# Patient Record
Sex: FEMALE | Race: BLACK OR AFRICAN AMERICAN | NOT HISPANIC OR LATINO | Employment: UNEMPLOYED | URBAN - METROPOLITAN AREA
[De-identification: names, ages, dates, MRNs, and addresses within clinical notes are randomized per-mention and may not be internally consistent; named-entity substitution may affect disease eponyms.]

---

## 2016-08-23 LAB
EXTERNAL ABO GROUPING: NORMAL
EXTERNAL ANTIBODY SCREEN: NEGATIVE
EXTERNAL HEPATITIS B SURFACE ANTIGEN: NEGATIVE
EXTERNAL RH FACTOR: POSITIVE
EXTERNAL SYPHILIS RPR SCREEN: NORMAL

## 2017-01-04 ENCOUNTER — ROUTINE PRENATAL (OUTPATIENT)
Dept: OBSTETRICS AND GYNECOLOGY | Facility: CLINIC | Age: 32
End: 2017-01-04

## 2017-01-04 VITALS — SYSTOLIC BLOOD PRESSURE: 128 MMHG | WEIGHT: 135 LBS | DIASTOLIC BLOOD PRESSURE: 60 MMHG

## 2017-01-04 DIAGNOSIS — Z3A.27 27 WEEKS GESTATION OF PREGNANCY: ICD-10-CM

## 2017-01-04 DIAGNOSIS — R82.90 ABNORMAL URINE ODOR: Primary | ICD-10-CM

## 2017-01-04 DIAGNOSIS — Z34.83 MULTIGRAVIDA IN THIRD TRIMESTER: ICD-10-CM

## 2017-01-04 DIAGNOSIS — O09.33 INSUFFICIENT PRENATAL CARE IN THIRD TRIMESTER: ICD-10-CM

## 2017-01-04 DIAGNOSIS — O99.333 TOBACCO USE AFFECTING PREGNANCY IN THIRD TRIMESTER, ANTEPARTUM: ICD-10-CM

## 2017-01-04 LAB
BILIRUB BLD-MCNC: ABNORMAL MG/DL
GLUCOSE UR STRIP-MCNC: NEGATIVE MG/DL
KETONES UR QL: ABNORMAL
LEUKOCYTE EST, POC: NEGATIVE
NITRITE UR-MCNC: POSITIVE MG/ML
PH UR: 6 [PH] (ref 5–8)
PROT UR STRIP-MCNC: ABNORMAL MG/DL
RBC # UR STRIP: NEGATIVE /UL
SP GR UR: 1.03 (ref 1–1.03)
UROBILINOGEN UR QL: NORMAL

## 2017-01-04 PROCEDURE — 81003 URINALYSIS AUTO W/O SCOPE: CPT | Performed by: OBSTETRICS & GYNECOLOGY

## 2017-01-04 PROCEDURE — 99214 OFFICE O/P EST MOD 30 MIN: CPT | Performed by: OBSTETRICS & GYNECOLOGY

## 2017-01-04 RX ORDER — NITROFURANTOIN 25; 75 MG/1; MG/1
100 CAPSULE ORAL 2 TIMES DAILY
Qty: 14 CAPSULE | Refills: 0 | Status: SHIPPED | OUTPATIENT
Start: 2017-01-04 | End: 2017-01-11

## 2017-01-04 NOTE — PROGRESS NOTES
"Pt states she has missed visit because she has been busy. Pt with multiple complaints, she c/o pelvic pain, n/v, body aches, needing referral to dentist for broken tooth, odor with urine and knot in middle of her chest that comes and goes when the knot is present it is painful. Cc u/a tace of ketones, small bilirubin & positive nitrites. Pt needs Gtt1 ordered along with Tox screen and urine culture.  Multiple issues - has fractured tooth.  Patient referred to [facility] dental.  Has lower back and pelvic pain - reassurance given.  No bleeding or spotting.  Mild nausea, but typically influenced by food intake - discussed dietary modifications.  Urine culture pending.  Knot on sternum likely \"vascular\" and not related to breast.  Patient to observe.  Size less than dates - sonogram at next visit.  I spent 35 minutes with patient.  "

## 2017-01-04 NOTE — MR AVS SNAPSHOT
Johanna Zeny   2017 3:15 PM   Routine Prenatal    Dept Phone:  [phone]   Encounter #:  [id number]    Provider:  Zaire Ann MD   Department:  [facility]                Your Full Care Plan              Your Updated Medication List          This list is accurate as of: 17  4:42 PM.  Always use your most recent med list.                promethazine 12.5 MG tablet   Commonly known as:  PHENERGAN   Take 1 tablet by mouth Every 6 (Six) Hours As Needed for nausea or vomiting.               We Performed the Following     Ambulatory Referral to Dentistry     Drug Profile Urine - 9 Drugs     Gestational Screen 1 Hr ([facility])     POC Urinalysis Dipstick, Automated     Urine Culture       You Were Diagnosed With        Codes Comments    Abnormal urine odor    -  Primary ICD-10-CM: R82.90  ICD-9-CM: 791.9     Tobacco use affecting pregnancy in third trimester, antepartum     ICD-10-CM: O99.333  ICD-9-CM: 649.03     27 weeks gestation of pregnancy     ICD-10-CM: Z3A.27  ICD-9-CM: V22.2     Insufficient prenatal care in third trimester     ICD-10-CM: O09.33  ICD-9-CM: V23.7     Multigravida in third trimester     ICD-10-CM: Z34.83  ICD-9-CM: V22.1       Instructions     None    Patient Instructions History      Upcoming Appointments     Visit Type Date Time Department    OB FOLLOWUP 2017  3:15 PM [facility] [place]    ULTRASOUND 2017  1:20 PM [facility] LOBGYN PRES    OB FOLLOWUP 2017  1:45 PM [facility] LOBGYN PRES      MyChart Signup     [facility] [employer] allows you to send messages to your doctor, view your test results, renew your prescriptions, schedule appointments, and more. To sign up, go to [employer] and click on the Sign Up Now link in the New User? box. Enter your [employer] Activation Code exactly as it appears below along with the last four digits of your Social Security Number and your Date of Birth () to  complete the sign-up process. If you do not sign up before the expiration date, you must request a new code.    [employer] Activation Code: [email or web address]  Expires: 1/18/2017  4:32 PM    If you have questions, you can email [email or web address] or call [phone] to talk to our [employer] staff. Remember, [employer] is NOT to be used for urgent needs. For medical emergencies, dial 911.               Other Info from Your Visit           Your Appointments     Jan 17, 2017  1:20 PM EST   Ultrasound with ULTRASOUND LOBGYN PRES   [facility] (--)    [address] [address] [phone]            Jan 17, 2017  1:45 PM EST   OB FOLLOWUP with Zaire Ann MD   [facility] (--)    [address] [address] [phone]              Allergies     No Known Allergies      Reason for Visit     Routine Prenatal Visit           Vital Signs     Blood Pressure Weight Last Menstrual Period Smoking Status          128/60 135 lb (61.2 kg) 06/10/2016 Current Every Day Smoker        Problems and Diagnoses Noted     Abnormal urine odor    -  Primary    Tobacco use affecting pregnancy in third trimester, antepartum        27 weeks gestation of pregnancy        Insufficient prenatal care in third trimester        Multigravida in third trimester          Results     POC Urinalysis Dipstick, Automated      Component Value Standard Range & Units    Glucose, UA Negative Negative, 1000 mg/dL (3+) mg/dL    Bilirubin Small (1+) Negative    Ketones, UA Trace Negative    Specific Gravity  1.030 1.005 - 1.030    Blood, UA Negative Negative    pH, Urine 6.0 5.0 - 8.0    Protein, POC 30 mg/dL Negative mg/dL    Urobilinogen, UA Normal Normal    Leukocytes Negative Negative    Nitrite, UA Positive Negative

## 2017-01-05 ENCOUNTER — TELEPHONE (OUTPATIENT)
Dept: OBSTETRICS AND GYNECOLOGY | Facility: CLINIC | Age: 32
End: 2017-01-05

## 2017-01-05 NOTE — TELEPHONE ENCOUNTER
----- Message from Zaire Ann MD sent at 1/4/2017  5:08 PM EST -----  Let her know that she has a UTI - I called her in antibiotics

## 2017-01-07 LAB
BACTERIA UR CULT: ABNORMAL
BACTERIA UR CULT: ABNORMAL
OTHER ANTIBIOTIC SUSC ISLT: ABNORMAL

## 2017-01-15 LAB
AMPHETAMINES UR QL SCN: NEGATIVE NG/ML
BARBITURATES UR QL SCN: NEGATIVE NG/ML
BENZODIAZ UR QL: NEGATIVE NG/ML
BZE UR QL: NEGATIVE NG/ML
CANNABINOIDS UR QL SCN: NEGATIVE NG/ML
METHADONE UR QL SCN: NEGATIVE NG/ML
OPIATES UR QL: POSITIVE
PCP UR QL: NEGATIVE NG/ML
PROPOXYPH UR QL: NEGATIVE NG/ML

## 2017-01-17 ENCOUNTER — PROCEDURE VISIT (OUTPATIENT)
Dept: OBSTETRICS AND GYNECOLOGY | Facility: CLINIC | Age: 32
End: 2017-01-17

## 2017-01-17 ENCOUNTER — ROUTINE PRENATAL (OUTPATIENT)
Dept: OBSTETRICS AND GYNECOLOGY | Facility: CLINIC | Age: 32
End: 2017-01-17

## 2017-01-17 VITALS — SYSTOLIC BLOOD PRESSURE: 115 MMHG | WEIGHT: 134 LBS | DIASTOLIC BLOOD PRESSURE: 71 MMHG

## 2017-01-17 DIAGNOSIS — O26.843 UTERINE SIZE DATE DISCREPANCY PREGNANCY, THIRD TRIMESTER: Primary | ICD-10-CM

## 2017-01-17 DIAGNOSIS — O99.333 TOBACCO USE AFFECTING PREGNANCY IN THIRD TRIMESTER, ANTEPARTUM: ICD-10-CM

## 2017-01-17 DIAGNOSIS — Z3A.29 29 WEEKS GESTATION OF PREGNANCY: Primary | ICD-10-CM

## 2017-01-17 DIAGNOSIS — Z34.83 MULTIGRAVIDA IN THIRD TRIMESTER: ICD-10-CM

## 2017-01-17 PROCEDURE — 76816 OB US FOLLOW-UP PER FETUS: CPT | Performed by: OBSTETRICS & GYNECOLOGY

## 2017-01-17 PROCEDURE — 99213 OFFICE O/P EST LOW 20 MIN: CPT | Performed by: OBSTETRICS & GYNECOLOGY

## 2017-01-17 NOTE — MR AVS SNAPSHOT
Johanna Moura   2017 1:45 PM   Routine Prenatal    Dept Phone:  [phone]   Encounter #:  [id number]    Provider:  Zaire Ann MD   Department:  [facility]                Your Full Care Plan              Your Updated Medication List          This list is accurate as of: 17  2:46 PM.  Always use your most recent med list.                promethazine 12.5 MG tablet   Commonly known as:  PHENERGAN   Take 1 tablet by mouth Every 6 (Six) Hours As Needed for nausea or vomiting.               Instructions     None    Patient Instructions History      Upcoming Appointments     Visit Type Date Time Department    ULTRASOUND 2017  1:20 PM [facility] LOB[facility] PRES    OB FOLLOWUP 2017  1:45 PM [facility] LOBGYN PRES      MyChart Signup     [facility] [employer] allows you to send messages to your doctor, view your test results, renew your prescriptions, schedule appointments, and more. To sign up, go to [employer] and click on the Sign Up Now link in the New User? box. Enter your [employer] Activation Code exactly as it appears below along with the last four digits of your Social Security Number and your Date of Birth () to complete the sign-up process. If you do not sign up before the expiration date, you must request a new code.    [employer] Activation Code: [email or web address]  Expires: 2017  4:32 PM    If you have questions, you can email [email or web address] or call [phone] to talk to our [employer] staff. Remember, [employer] is NOT to be used for urgent needs. For medical emergencies, dial 911.               Other Info from Your Visit           Allergies     No Known Allergies      Reason for Visit     Routine Prenatal Visit           Vital Signs     Blood Pressure Weight Last Menstrual Period Smoking Status          115/71 134 lb (60.8 kg) 06/10/2016 Current Every Day Smoker

## 2017-01-17 NOTE — MR AVS SNAPSHOT
[facility]  [phone]                    Johanna Moura   2017 1:20 PM   Procedure visit    Dept Phone:  [phone]   Encounter #:  [id number]    Provider:  ULTRASOUND LOBGYN PRES   Department:  [facility]                Your Full Care Plan              Your Updated Medication List          This list is accurate as of: 17  1:48 PM.  Always use your most recent med list.                promethazine 12.5 MG tablet   Commonly known as:  PHENERGAN   Take 1 tablet by mouth Every 6 (Six) Hours As Needed for nausea or vomiting.               We Performed the Following     US Ob Follow Up Transabdominal Approach       You Were Diagnosed With        Codes Comments    Uterine size date discrepancy pregnancy, third trimester    -  Primary ICD-10-CM: O26.843  ICD-9-CM: 649.63       Instructions     None    Patient Instructions History      Upcoming Appointments     Visit Type Date Time Department    ULTRASOUND 2017  1:20 PM [facility] LOBGYN PRES    OB FOLLOWUP 2017  1:45 PM [facility] LOBGYN PRES      [employer] Signup     [employer] allows you to send messages to your doctor, view your test results, renew your prescriptions, schedule appointments, and more. To sign up, go to [employer] and click on the Sign Up Now link in the New User? box. Enter your [employer] Activation Code exactly as it appears below along with the last four digits of your Social Security Number and your Date of Birth () to complete the sign-up process. If you do not sign up before the expiration date, you must request a new code.    [employer] Activation Code: [email or web address]  Expires: 2017  4:32 PM    If you have questions, you can email [email or web address] or call [phone] to talk to our [employer] staff. Remember, [employer] is NOT to be used for urgent needs. For medical emergencies, dial 911.               Other Info from Your Visit           Allergies     No Known Allergies      Vital Signs     Last Menstrual Period Smoking Status                06/10/2016 Current Every Day Smoker          Problems and Diagnoses Noted     Measured size of uterus different from size expected for dates    -  Primary

## 2017-01-31 ENCOUNTER — ROUTINE PRENATAL (OUTPATIENT)
Dept: OBSTETRICS AND GYNECOLOGY | Facility: CLINIC | Age: 32
End: 2017-01-31

## 2017-01-31 VITALS — WEIGHT: 140 LBS | DIASTOLIC BLOOD PRESSURE: 78 MMHG | SYSTOLIC BLOOD PRESSURE: 122 MMHG

## 2017-01-31 DIAGNOSIS — Z3A.31 31 WEEKS GESTATION OF PREGNANCY: Primary | ICD-10-CM

## 2017-01-31 DIAGNOSIS — Z34.83 MULTIGRAVIDA IN THIRD TRIMESTER: ICD-10-CM

## 2017-01-31 DIAGNOSIS — O99.333 TOBACCO USE AFFECTING PREGNANCY IN THIRD TRIMESTER, ANTEPARTUM: ICD-10-CM

## 2017-01-31 PROCEDURE — 99213 OFFICE O/P EST LOW 20 MIN: CPT | Performed by: OBSTETRICS & GYNECOLOGY

## 2017-01-31 NOTE — PROGRESS NOTES
Cc:  UTI symptoms.  She is still c/o her side hurting and odor with urine. Pt still needs Gtt1 done states she will come back this afternoon and do.  UTI symptoms - patient requesting antibiotics.   States has left sided abdominal pain and odor with urination.  No fevers or chills.  Appetite normal. Needs to leave sterile urine first.  She is returning for glucola and I instructed her to do this.  Patient to follow up in 2 weeks for regular visit.  I spent 15 minutes with patient.

## 2017-01-31 NOTE — MR AVS SNAPSHOT
Johanna Moura   2017 9:00 AM   Routine Prenatal    Dept Phone:  [phone]   Encounter #:  [id number]    Provider:  Zaire Ann MD   Department:  [facility]                Your Full Care Plan              Your Updated Medication List          This list is accurate as of: 17 10:21 AM.  Always use your most recent med list.                promethazine 12.5 MG tablet   Commonly known as:  PHENERGAN   Take 1 tablet by mouth Every 6 (Six) Hours As Needed for nausea or vomiting.               Instructions     None    Patient Instructions History      Upcoming Appointments     Visit Type Date Time Department    OB FOLLOWUP 2017  9:00 AM [facility] PRES      [employer] Signup     [facility] [employer] allows you to send messages to your doctor, view your test results, renew your prescriptions, schedule appointments, and more. To sign up, go to [employer] and click on the Sign Up Now link in the New User? box. Enter your [employer] Activation Code exactly as it appears below along with the last four digits of your Social Security Number and your Date of Birth () to complete the sign-up process. If you do not sign up before the expiration date, you must request a new code.    [employer] Activation Code: [email or web address]  Expires: 2017 10:21 AM    If you have questions, you can email [email or web address] or call [phone] to talk to our [employer] staff. Remember, [employer] is NOT to be used for urgent needs. For medical emergencies, dial 911.               Other Info from Your Visit           Allergies     No Known Allergies      Reason for Visit     Routine Prenatal Visit           Vital Signs     Blood Pressure Weight Last Menstrual Period Smoking Status          122/78 140 lb (63.5 kg) 06/10/2016 Current Every Day Smoker

## 2017-02-08 LAB — GLUCOSE 1H P 50 G GLC PO SERPL-MCNC: 143 MG/DL (ref 65–139)

## 2017-02-09 ENCOUNTER — TELEPHONE (OUTPATIENT)
Dept: OBSTETRICS AND GYNECOLOGY | Facility: CLINIC | Age: 32
End: 2017-02-09

## 2017-02-09 NOTE — TELEPHONE ENCOUNTER
----- Message from Zaire Ann MD sent at 2/8/2017  8:06 PM EST -----  LAW - Let her know that she did not pass her diabetes test and she needs to do the 3 hour test.  Thanks.

## 2017-02-10 ENCOUNTER — TELEPHONE (OUTPATIENT)
Dept: OBSTETRICS AND GYNECOLOGY | Facility: CLINIC | Age: 32
End: 2017-02-10

## 2017-02-10 DIAGNOSIS — O99.810 ABNORMAL GLUCOSE IN PREGNANCY, ANTEPARTUM: Primary | ICD-10-CM

## 2017-02-11 LAB
GLUCOSE 1H P 100 G GLC PO SERPL-MCNC: 101 MG/DL (ref 50–350)
GLUCOSE 2H P 100 G GLC PO SERPL-MCNC: 95 MG/DL (ref 50–350)
GLUCOSE 3H P 100 G GLC PO SERPL-MCNC: 67 MG/DL
GLUCOSE P FAST SERPL-MCNC: 108 MG/DL

## 2017-02-14 ENCOUNTER — ROUTINE PRENATAL (OUTPATIENT)
Dept: OBSTETRICS AND GYNECOLOGY | Facility: CLINIC | Age: 32
End: 2017-02-14

## 2017-02-14 VITALS — DIASTOLIC BLOOD PRESSURE: 87 MMHG | WEIGHT: 140 LBS | SYSTOLIC BLOOD PRESSURE: 138 MMHG

## 2017-02-14 DIAGNOSIS — Z34.83 MULTIGRAVIDA IN THIRD TRIMESTER: ICD-10-CM

## 2017-02-14 DIAGNOSIS — Z3A.33 33 WEEKS GESTATION OF PREGNANCY: Primary | ICD-10-CM

## 2017-02-14 DIAGNOSIS — O99.333 TOBACCO USE AFFECTING PREGNANCY IN THIRD TRIMESTER, ANTEPARTUM: ICD-10-CM

## 2017-02-14 PROCEDURE — 99213 OFFICE O/P EST LOW 20 MIN: CPT | Performed by: OBSTETRICS & GYNECOLOGY

## 2017-02-14 PROCEDURE — 99406 BEHAV CHNG SMOKING 3-10 MIN: CPT | Performed by: OBSTETRICS & GYNECOLOGY

## 2017-02-28 ENCOUNTER — ROUTINE PRENATAL (OUTPATIENT)
Dept: OBSTETRICS AND GYNECOLOGY | Facility: CLINIC | Age: 32
End: 2017-02-28

## 2017-02-28 ENCOUNTER — HOSPITAL ENCOUNTER (OUTPATIENT)
Facility: HOSPITAL | Age: 32
Setting detail: OBSERVATION
Discharge: HOME | End: 2017-02-28
Attending: OBSTETRICS & GYNECOLOGY | Admitting: OBSTETRICS & GYNECOLOGY

## 2017-02-28 VITALS
WEIGHT: 145 LBS | RESPIRATION RATE: 16 BRPM | OXYGEN SATURATION: 100 % | TEMPERATURE: 98.8 F | SYSTOLIC BLOOD PRESSURE: 123 MMHG | DIASTOLIC BLOOD PRESSURE: 89 MMHG | BODY MASS INDEX: 21.98 KG/M2 | HEIGHT: 68 IN | HEART RATE: 79 BPM

## 2017-02-28 VITALS — SYSTOLIC BLOOD PRESSURE: 151 MMHG | DIASTOLIC BLOOD PRESSURE: 90 MMHG | WEIGHT: 145 LBS

## 2017-02-28 DIAGNOSIS — Z3A.35 35 WEEKS GESTATION OF PREGNANCY: Primary | ICD-10-CM

## 2017-02-28 DIAGNOSIS — O13.3 PIH (PREGNANCY INDUCED HYPERTENSION), THIRD TRIMESTER: ICD-10-CM

## 2017-02-28 DIAGNOSIS — O99.333 TOBACCO USE AFFECTING PREGNANCY IN THIRD TRIMESTER, ANTEPARTUM: ICD-10-CM

## 2017-02-28 DIAGNOSIS — Z34.83 MULTIGRAVIDA IN THIRD TRIMESTER: ICD-10-CM

## 2017-02-28 PROBLEM — Z34.90 PREGNANCY: Status: ACTIVE | Noted: 2017-02-28

## 2017-02-28 LAB
ALBUMIN SERPL-MCNC: 2.9 G/DL (ref 3.5–5.2)
ALBUMIN/GLOB SERPL: 0.7 G/DL
ALP SERPL-CCNC: 205 U/L (ref 39–117)
ALT SERPL W P-5'-P-CCNC: 7 U/L (ref 1–33)
AMPHET+METHAMPHET UR QL: NEGATIVE
ANION GAP SERPL CALCULATED.3IONS-SCNC: 13.6 MMOL/L
AST SERPL-CCNC: 18 U/L (ref 1–32)
BARBITURATES UR QL SCN: NEGATIVE
BASOPHILS # BLD AUTO: 0.01 10*3/MM3 (ref 0–0.2)
BASOPHILS NFR BLD AUTO: 0.2 % (ref 0–1.5)
BENZODIAZ UR QL SCN: NEGATIVE
BILIRUB SERPL-MCNC: 0.3 MG/DL (ref 0.1–1.2)
BILIRUB UR QL STRIP: NEGATIVE
BUN BLD-MCNC: 5 MG/DL (ref 6–20)
BUN/CREAT SERPL: 8.8 (ref 7–25)
CALCIUM SPEC-SCNC: 9 MG/DL (ref 8.6–10.5)
CANNABINOIDS SERPL QL: NEGATIVE
CHLORIDE SERPL-SCNC: 100 MMOL/L (ref 98–107)
CLARITY UR: CLEAR
CO2 SERPL-SCNC: 23.4 MMOL/L (ref 22–29)
COCAINE UR QL: NEGATIVE
COLOR UR: YELLOW
CREAT BLD-MCNC: 0.57 MG/DL (ref 0.57–1)
DEPRECATED RDW RBC AUTO: 37.5 FL (ref 37–54)
EOSINOPHIL # BLD AUTO: 0.03 10*3/MM3 (ref 0–0.7)
EOSINOPHIL NFR BLD AUTO: 0.5 % (ref 0.3–6.2)
ERYTHROCYTE [DISTWIDTH] IN BLOOD BY AUTOMATED COUNT: 12.6 % (ref 11.7–13)
EXPIRATION DATE: ABNORMAL
EXTERNAL GROUP B STREP ANTIGEN: NORMAL
GFR SERPL CREATININE-BSD FRML MDRD: 150 ML/MIN/1.73
GLOBULIN UR ELPH-MCNC: 4 GM/DL
GLUCOSE BLD-MCNC: 81 MG/DL (ref 65–99)
GLUCOSE UR STRIP-MCNC: NEGATIVE MG/DL
HBV SURFACE AG SERPL QL IA: NORMAL
HCT VFR BLD AUTO: 32.3 % (ref 35.6–45.5)
HCV AB SER DONR QL: NORMAL
HCV AB SER DONR QL: NORMAL
HGB BLD-MCNC: 10.2 G/DL (ref 11.9–15.5)
HGB UR QL STRIP.AUTO: NEGATIVE
HIV1 P24 AG SER QL: NORMAL
HIV1+2 AB SER QL: NORMAL
IMM GRANULOCYTES # BLD: 0.02 10*3/MM3 (ref 0–0.03)
IMM GRANULOCYTES NFR BLD: 0.3 % (ref 0–0.5)
KETONES UR QL STRIP: NEGATIVE
LEUKOCYTE ESTERASE UR QL STRIP.AUTO: NEGATIVE
LYMPHOCYTES # BLD AUTO: 1.62 10*3/MM3 (ref 0.9–4.8)
LYMPHOCYTES NFR BLD AUTO: 24.8 % (ref 19.6–45.3)
Lab: ABNORMAL
MCH RBC QN AUTO: 25.7 PG (ref 26.9–32)
MCHC RBC AUTO-ENTMCNC: 31.6 G/DL (ref 32.4–36.3)
MCV RBC AUTO: 81.4 FL (ref 80.5–98.2)
METHADONE UR QL SCN: NEGATIVE
MONOCYTES # BLD AUTO: 0.47 10*3/MM3 (ref 0.2–1.2)
MONOCYTES NFR BLD AUTO: 7.2 % (ref 5–12)
NEUTROPHILS # BLD AUTO: 4.39 10*3/MM3 (ref 1.9–8.1)
NEUTROPHILS NFR BLD AUTO: 67 % (ref 42.7–76)
NITRITE UR QL STRIP: NEGATIVE
OPIATES UR QL: POSITIVE
OXYCODONE UR QL SCN: NEGATIVE
PH UR STRIP.AUTO: 7 [PH] (ref 5–8)
PLATELET # BLD AUTO: 221 10*3/MM3 (ref 140–500)
PMV BLD AUTO: 10.9 FL (ref 6–12)
POTASSIUM BLD-SCNC: 4 MMOL/L (ref 3.5–5.2)
PROT SERPL-MCNC: 6.9 G/DL (ref 6–8.5)
PROT UR QL STRIP: NEGATIVE
PROT UR STRIP-MCNC: ABNORMAL MG/DL
RBC # BLD AUTO: 3.97 10*6/MM3 (ref 3.9–5.2)
SODIUM BLD-SCNC: 137 MMOL/L (ref 136–145)
SP GR UR STRIP: 1.01 (ref 1–1.03)
UROBILINOGEN UR QL STRIP: NORMAL
WBC NRBC COR # BLD: 6.54 10*3/MM3 (ref 4.5–10.7)

## 2017-02-28 PROCEDURE — 59025 FETAL NON-STRESS TEST: CPT | Performed by: OBSTETRICS & GYNECOLOGY

## 2017-02-28 PROCEDURE — 85025 COMPLETE CBC W/AUTO DIFF WBC: CPT | Performed by: OBSTETRICS & GYNECOLOGY

## 2017-02-28 PROCEDURE — 80307 DRUG TEST PRSMV CHEM ANLYZR: CPT | Performed by: OBSTETRICS & GYNECOLOGY

## 2017-02-28 PROCEDURE — 81003 URINALYSIS AUTO W/O SCOPE: CPT | Performed by: OBSTETRICS & GYNECOLOGY

## 2017-02-28 PROCEDURE — G0432 EIA HIV-1/HIV-2 SCREEN: HCPCS | Performed by: OBSTETRICS & GYNECOLOGY

## 2017-02-28 PROCEDURE — 87899 AGENT NOS ASSAY W/OPTIC: CPT | Performed by: OBSTETRICS & GYNECOLOGY

## 2017-02-28 PROCEDURE — G0378 HOSPITAL OBSERVATION PER HR: HCPCS

## 2017-02-28 PROCEDURE — 86803 HEPATITIS C AB TEST: CPT | Performed by: OBSTETRICS & GYNECOLOGY

## 2017-02-28 PROCEDURE — 99214 OFFICE O/P EST MOD 30 MIN: CPT | Performed by: OBSTETRICS & GYNECOLOGY

## 2017-02-28 PROCEDURE — 59025 FETAL NON-STRESS TEST: CPT

## 2017-02-28 PROCEDURE — 80053 COMPREHEN METABOLIC PANEL: CPT | Performed by: OBSTETRICS & GYNECOLOGY

## 2017-02-28 PROCEDURE — 87340 HEPATITIS B SURFACE AG IA: CPT | Performed by: OBSTETRICS & GYNECOLOGY

## 2017-02-28 RX ORDER — HYDROCODONE BITARTRATE AND ACETAMINOPHEN 7.5; 325 MG/1; MG/1
1 TABLET ORAL EVERY 6 HOURS PRN
COMMUNITY
End: 2017-03-14 | Stop reason: HOSPADM

## 2017-02-28 NOTE — PROGRESS NOTES
Cc:  C/o n/v, she has/uses promethazine but does not really help. She also c/o ankles and feet swelling.     Patient generally feels well.  AFM.  Has appetite but has vomiting after eating.  Promethazine not working well.  No chest pain or visual symptoms.  No bleeding or spotting.  Some cramping.  Swelling in legs described.  Exam:  As above with Gen - alert and in no distress.  Abdomen - soft, gravid.  Repeat /100    A/P:  IUP at 35+ weeks with elevated BP  - Gbs done.  - Patient to go to triage for further evaluation.  Understands rationale  - Follow up in one week  - I spent 25 minutes with patient and greater than 50% in counseling.

## 2017-03-01 ENCOUNTER — TELEPHONE (OUTPATIENT)
Dept: LABOR AND DELIVERY | Facility: HOSPITAL | Age: 32
End: 2017-03-01

## 2017-03-02 LAB — GP B STREP DNA SPEC QL NAA+PROBE: NEGATIVE

## 2017-03-09 ENCOUNTER — ANESTHESIA EVENT (OUTPATIENT)
Dept: LABOR AND DELIVERY | Facility: HOSPITAL | Age: 32
End: 2017-03-09

## 2017-03-09 ENCOUNTER — ANESTHESIA (OUTPATIENT)
Dept: LABOR AND DELIVERY | Facility: HOSPITAL | Age: 32
End: 2017-03-09

## 2017-03-09 ENCOUNTER — HOSPITAL ENCOUNTER (INPATIENT)
Facility: HOSPITAL | Age: 32
LOS: 5 days | Discharge: HOME | End: 2017-03-14
Attending: OBSTETRICS & GYNECOLOGY | Admitting: OBSTETRICS & GYNECOLOGY

## 2017-03-09 DIAGNOSIS — Z37.9 NORMAL LABOR: Primary | ICD-10-CM

## 2017-03-09 DIAGNOSIS — O13.3 GESTATIONAL HYPERTENSION W/O SIGNIFICANT PROTEINURIA IN 3RD TRIMESTER: ICD-10-CM

## 2017-03-09 PROBLEM — O99.333 TOBACCO SMOKING AFFECTING PREGNANCY IN THIRD TRIMESTER: Status: ACTIVE | Noted: 2017-03-09

## 2017-03-09 LAB
ABO GROUP BLD: NORMAL
ALBUMIN SERPL-MCNC: 3 G/DL (ref 3.5–5.2)
ALBUMIN/GLOB SERPL: 0.7 G/DL
ALP SERPL-CCNC: 239 U/L (ref 39–117)
ALT SERPL W P-5'-P-CCNC: 8 U/L (ref 1–33)
AMPHET+METHAMPHET UR QL: NEGATIVE
ANION GAP SERPL CALCULATED.3IONS-SCNC: 15 MMOL/L
AST SERPL-CCNC: 21 U/L (ref 1–32)
BARBITURATES UR QL SCN: NEGATIVE
BASOPHILS # BLD AUTO: 0.01 10*3/MM3 (ref 0–0.2)
BASOPHILS NFR BLD AUTO: 0.1 % (ref 0–1.5)
BENZODIAZ UR QL SCN: NEGATIVE
BILIRUB SERPL-MCNC: 0.3 MG/DL (ref 0.1–1.2)
BLD GP AB SCN SERPL QL: NEGATIVE
BUN BLD-MCNC: 7 MG/DL (ref 6–20)
BUN/CREAT SERPL: 10.1 (ref 7–25)
CALCIUM SPEC-SCNC: 9.1 MG/DL (ref 8.6–10.5)
CANNABINOIDS SERPL QL: NEGATIVE
CHLORIDE SERPL-SCNC: 99 MMOL/L (ref 98–107)
CO2 SERPL-SCNC: 24 MMOL/L (ref 22–29)
COCAINE UR QL: NEGATIVE
CREAT BLD-MCNC: 0.69 MG/DL (ref 0.57–1)
DEPRECATED RDW RBC AUTO: 37.9 FL (ref 37–54)
EOSINOPHIL # BLD AUTO: 0.01 10*3/MM3 (ref 0–0.7)
EOSINOPHIL NFR BLD AUTO: 0.1 % (ref 0.3–6.2)
ERYTHROCYTE [DISTWIDTH] IN BLOOD BY AUTOMATED COUNT: 12.8 % (ref 11.7–13)
GFR SERPL CREATININE-BSD FRML MDRD: 120 ML/MIN/1.73
GLOBULIN UR ELPH-MCNC: 4.1 GM/DL
GLUCOSE BLD-MCNC: 87 MG/DL (ref 65–99)
HCT VFR BLD AUTO: 34.2 % (ref 35.6–45.5)
HGB BLD-MCNC: 11 G/DL (ref 11.9–15.5)
IMM GRANULOCYTES # BLD: 0.02 10*3/MM3 (ref 0–0.03)
IMM GRANULOCYTES NFR BLD: 0.2 % (ref 0–0.5)
LYMPHOCYTES # BLD AUTO: 2.32 10*3/MM3 (ref 0.9–4.8)
LYMPHOCYTES NFR BLD AUTO: 25.7 % (ref 19.6–45.3)
MCH RBC QN AUTO: 26.1 PG (ref 26.9–32)
MCHC RBC AUTO-ENTMCNC: 32.2 G/DL (ref 32.4–36.3)
MCV RBC AUTO: 81.2 FL (ref 80.5–98.2)
METHADONE UR QL SCN: NEGATIVE
MONOCYTES # BLD AUTO: 0.64 10*3/MM3 (ref 0.2–1.2)
MONOCYTES NFR BLD AUTO: 7.1 % (ref 5–12)
NEUTROPHILS # BLD AUTO: 6.01 10*3/MM3 (ref 1.9–8.1)
NEUTROPHILS NFR BLD AUTO: 66.8 % (ref 42.7–76)
OPIATES UR QL: POSITIVE
OXYCODONE UR QL SCN: POSITIVE
PLATELET # BLD AUTO: 211 10*3/MM3 (ref 140–500)
PMV BLD AUTO: 11.3 FL (ref 6–12)
POTASSIUM BLD-SCNC: 3.7 MMOL/L (ref 3.5–5.2)
PROT SERPL-MCNC: 7.1 G/DL (ref 6–8.5)
RBC # BLD AUTO: 4.21 10*6/MM3 (ref 3.9–5.2)
RH BLD: POSITIVE
SODIUM BLD-SCNC: 138 MMOL/L (ref 136–145)
WBC NRBC COR # BLD: 9.01 10*3/MM3 (ref 4.5–10.7)

## 2017-03-09 PROCEDURE — 85025 COMPLETE CBC W/AUTO DIFF WBC: CPT | Performed by: OBSTETRICS & GYNECOLOGY

## 2017-03-09 PROCEDURE — 86900 BLOOD TYPING SEROLOGIC ABO: CPT | Performed by: OBSTETRICS & GYNECOLOGY

## 2017-03-09 PROCEDURE — 25010000002 ROPIVACAINE PER 1 MG: Performed by: ANESTHESIOLOGY

## 2017-03-09 PROCEDURE — 80307 DRUG TEST PRSMV CHEM ANLYZR: CPT | Performed by: OBSTETRICS & GYNECOLOGY

## 2017-03-09 PROCEDURE — 10907ZC DRAINAGE OF AMNIOTIC FLUID, THERAPEUTIC FROM PRODUCTS OF CONCEPTION, VIA NATURAL OR ARTIFICIAL OPENING: ICD-10-PCS | Performed by: OBSTETRICS & GYNECOLOGY

## 2017-03-09 PROCEDURE — 86901 BLOOD TYPING SEROLOGIC RH(D): CPT | Performed by: OBSTETRICS & GYNECOLOGY

## 2017-03-09 PROCEDURE — C1755 CATHETER, INTRASPINAL: HCPCS

## 2017-03-09 PROCEDURE — C1755 CATHETER, INTRASPINAL: HCPCS | Performed by: ANESTHESIOLOGY

## 2017-03-09 PROCEDURE — 86850 RBC ANTIBODY SCREEN: CPT | Performed by: OBSTETRICS & GYNECOLOGY

## 2017-03-09 PROCEDURE — 80053 COMPREHEN METABOLIC PANEL: CPT | Performed by: OBSTETRICS & GYNECOLOGY

## 2017-03-09 RX ORDER — ONDANSETRON 2 MG/ML
4 INJECTION INTRAMUSCULAR; INTRAVENOUS EVERY 6 HOURS PRN
Status: DISCONTINUED | OUTPATIENT
Start: 2017-03-09 | End: 2017-03-10

## 2017-03-09 RX ORDER — SODIUM CHLORIDE 0.9 % (FLUSH) 0.9 %
1-10 SYRINGE (ML) INJECTION AS NEEDED
Status: DISCONTINUED | OUTPATIENT
Start: 2017-03-09 | End: 2017-03-10

## 2017-03-09 RX ORDER — FAMOTIDINE 10 MG/ML
20 INJECTION, SOLUTION INTRAVENOUS ONCE AS NEEDED
Status: DISCONTINUED | OUTPATIENT
Start: 2017-03-09 | End: 2017-03-10

## 2017-03-09 RX ORDER — SUFENTANIL CITRATE 50 UG/ML
INJECTION EPIDURAL; INTRAVENOUS
Status: DISPENSED
Start: 2017-03-09 | End: 2017-03-10

## 2017-03-09 RX ORDER — ACETAMINOPHEN 325 MG/1
650 TABLET ORAL EVERY 4 HOURS PRN
Status: DISCONTINUED | OUTPATIENT
Start: 2017-03-09 | End: 2017-03-10

## 2017-03-09 RX ORDER — BUTORPHANOL TARTRATE 1 MG/ML
1 INJECTION, SOLUTION INTRAMUSCULAR; INTRAVENOUS
Status: DISCONTINUED | OUTPATIENT
Start: 2017-03-09 | End: 2017-03-10

## 2017-03-09 RX ORDER — FAMOTIDINE 10 MG/ML
20 INJECTION, SOLUTION INTRAVENOUS 2 TIMES DAILY
Status: DISCONTINUED | OUTPATIENT
Start: 2017-03-09 | End: 2017-03-10

## 2017-03-09 RX ORDER — FAMOTIDINE 20 MG/1
20 TABLET, FILM COATED ORAL 2 TIMES DAILY
Status: DISCONTINUED | OUTPATIENT
Start: 2017-03-09 | End: 2017-03-10

## 2017-03-09 RX ORDER — ONDANSETRON 4 MG/1
4 TABLET, FILM COATED ORAL EVERY 6 HOURS PRN
Status: DISCONTINUED | OUTPATIENT
Start: 2017-03-09 | End: 2017-03-10

## 2017-03-09 RX ORDER — TERBUTALINE SULFATE 1 MG/ML
0.25 INJECTION, SOLUTION SUBCUTANEOUS AS NEEDED
Status: DISCONTINUED | OUTPATIENT
Start: 2017-03-09 | End: 2017-03-10

## 2017-03-09 RX ORDER — DIPHENHYDRAMINE HCL 25 MG
25 CAPSULE ORAL NIGHTLY PRN
Status: DISCONTINUED | OUTPATIENT
Start: 2017-03-09 | End: 2017-03-10

## 2017-03-09 RX ORDER — DIPHENHYDRAMINE HYDROCHLORIDE 50 MG/ML
25 INJECTION INTRAMUSCULAR; INTRAVENOUS NIGHTLY PRN
Status: DISCONTINUED | OUTPATIENT
Start: 2017-03-09 | End: 2017-03-10

## 2017-03-09 RX ORDER — SODIUM CHLORIDE, SODIUM LACTATE, POTASSIUM CHLORIDE, CALCIUM CHLORIDE 600; 310; 30; 20 MG/100ML; MG/100ML; MG/100ML; MG/100ML
125 INJECTION, SOLUTION INTRAVENOUS CONTINUOUS
Status: DISCONTINUED | OUTPATIENT
Start: 2017-03-09 | End: 2017-03-10

## 2017-03-09 RX ORDER — DIPHENHYDRAMINE HYDROCHLORIDE 50 MG/ML
12.5 INJECTION INTRAMUSCULAR; INTRAVENOUS EVERY 8 HOURS PRN
Status: DISCONTINUED | OUTPATIENT
Start: 2017-03-09 | End: 2017-03-10

## 2017-03-09 RX ORDER — LIDOCAINE HYDROCHLORIDE 10 MG/ML
5 INJECTION, SOLUTION INFILTRATION; PERINEURAL AS NEEDED
Status: DISCONTINUED | OUTPATIENT
Start: 2017-03-09 | End: 2017-03-10

## 2017-03-09 RX ORDER — ROPIVACAINE HYDROCHLORIDE 2 MG/ML
INJECTION, SOLUTION EPIDURAL; INFILTRATION; PERINEURAL AS NEEDED
Status: DISCONTINUED | OUTPATIENT
Start: 2017-03-09 | End: 2017-03-10 | Stop reason: SURG

## 2017-03-09 RX ORDER — ONDANSETRON 2 MG/ML
4 INJECTION INTRAMUSCULAR; INTRAVENOUS ONCE AS NEEDED
Status: DISCONTINUED | OUTPATIENT
Start: 2017-03-09 | End: 2017-03-10

## 2017-03-09 RX ORDER — ONDANSETRON 4 MG/1
4 TABLET, ORALLY DISINTEGRATING ORAL EVERY 6 HOURS PRN
Status: DISCONTINUED | OUTPATIENT
Start: 2017-03-09 | End: 2017-03-10

## 2017-03-09 RX ADMIN — SODIUM CHLORIDE, POTASSIUM CHLORIDE, SODIUM LACTATE AND CALCIUM CHLORIDE 125 ML/HR: 600; 310; 30; 20 INJECTION, SOLUTION INTRAVENOUS at 21:40

## 2017-03-09 RX ADMIN — LIDOCAINE HYDROCHLORIDE 10 ML: 15 INJECTION, SOLUTION EPIDURAL; INFILTRATION; INTRACAUDAL; PERINEURAL at 21:49

## 2017-03-09 RX ADMIN — Medication 10 ML/HR: at 21:50

## 2017-03-09 RX ADMIN — SODIUM CHLORIDE, POTASSIUM CHLORIDE, SODIUM LACTATE AND CALCIUM CHLORIDE 1000 ML: 600; 310; 30; 20 INJECTION, SOLUTION INTRAVENOUS at 20:35

## 2017-03-09 RX ADMIN — ROPIVACAINE HYDROCHLORIDE 12 ML: 2 INJECTION, SOLUTION EPIDURAL; INFILTRATION at 23:14

## 2017-03-10 PROBLEM — Z37.9 NORMAL LABOR: Status: RESOLVED | Noted: 2017-03-09 | Resolved: 2017-03-10

## 2017-03-10 LAB
ALBUMIN SERPL-MCNC: 2.7 G/DL (ref 3.5–5.2)
ALBUMIN/GLOB SERPL: 0.7 G/DL
ALP SERPL-CCNC: 203 U/L (ref 39–117)
ALT SERPL W P-5'-P-CCNC: 6 U/L (ref 1–33)
ANION GAP SERPL CALCULATED.3IONS-SCNC: 11.7 MMOL/L
AST SERPL-CCNC: 21 U/L (ref 1–32)
BASOPHILS # BLD AUTO: 0.01 10*3/MM3 (ref 0–0.2)
BASOPHILS NFR BLD AUTO: 0.1 % (ref 0–1.5)
BILIRUB SERPL-MCNC: 0.3 MG/DL (ref 0.1–1.2)
BUN BLD-MCNC: 4 MG/DL (ref 6–20)
BUN/CREAT SERPL: 6.7 (ref 7–25)
CALCIUM SPEC-SCNC: 8.1 MG/DL (ref 8.6–10.5)
CHLORIDE SERPL-SCNC: 103 MMOL/L (ref 98–107)
CO2 SERPL-SCNC: 24.3 MMOL/L (ref 22–29)
CREAT BLD-MCNC: 0.6 MG/DL (ref 0.57–1)
DEPRECATED RDW RBC AUTO: 37.5 FL (ref 37–54)
EOSINOPHIL # BLD AUTO: 0.06 10*3/MM3 (ref 0–0.7)
EOSINOPHIL NFR BLD AUTO: 0.6 % (ref 0.3–6.2)
ERYTHROCYTE [DISTWIDTH] IN BLOOD BY AUTOMATED COUNT: 12.9 % (ref 11.7–13)
EXPIRATION DATE: NORMAL
GFR SERPL CREATININE-BSD FRML MDRD: 141 ML/MIN/1.73
GLOBULIN UR ELPH-MCNC: 3.7 GM/DL
GLUCOSE BLD-MCNC: 74 MG/DL (ref 65–99)
HCT VFR BLD AUTO: 33.4 % (ref 35.6–45.5)
HGB BLD-MCNC: 10.5 G/DL (ref 11.9–15.5)
IMM GRANULOCYTES # BLD: 0.04 10*3/MM3 (ref 0–0.03)
IMM GRANULOCYTES NFR BLD: 0.4 % (ref 0–0.5)
LYMPHOCYTES # BLD AUTO: 2.05 10*3/MM3 (ref 0.9–4.8)
LYMPHOCYTES NFR BLD AUTO: 20.7 % (ref 19.6–45.3)
Lab: NORMAL
MAGNESIUM SERPL-MCNC: 5 MG/DL (ref 1.6–2.6)
MCH RBC QN AUTO: 25.3 PG (ref 26.9–32)
MCHC RBC AUTO-ENTMCNC: 31.4 G/DL (ref 32.4–36.3)
MCV RBC AUTO: 80.5 FL (ref 80.5–98.2)
MONOCYTES # BLD AUTO: 0.67 10*3/MM3 (ref 0.2–1.2)
MONOCYTES NFR BLD AUTO: 6.8 % (ref 5–12)
NEUTROPHILS # BLD AUTO: 7.08 10*3/MM3 (ref 1.9–8.1)
NEUTROPHILS NFR BLD AUTO: 71.4 % (ref 42.7–76)
PLATELET # BLD AUTO: 199 10*3/MM3 (ref 140–500)
PMV BLD AUTO: 11 FL (ref 6–12)
POTASSIUM BLD-SCNC: 3.5 MMOL/L (ref 3.5–5.2)
PROT SERPL-MCNC: 6.4 G/DL (ref 6–8.5)
PROT UR STRIP-MCNC: NEGATIVE MG/DL
RBC # BLD AUTO: 4.15 10*6/MM3 (ref 3.9–5.2)
SODIUM BLD-SCNC: 139 MMOL/L (ref 136–145)
WBC NRBC COR # BLD: 9.91 10*3/MM3 (ref 4.5–10.7)

## 2017-03-10 PROCEDURE — 85025 COMPLETE CBC W/AUTO DIFF WBC: CPT | Performed by: OBSTETRICS & GYNECOLOGY

## 2017-03-10 PROCEDURE — 83735 ASSAY OF MAGNESIUM: CPT | Performed by: OBSTETRICS & GYNECOLOGY

## 2017-03-10 PROCEDURE — 99024 POSTOP FOLLOW-UP VISIT: CPT | Performed by: OBSTETRICS & GYNECOLOGY

## 2017-03-10 PROCEDURE — 25010000002 MAGNESIUM SULFATE 40 GM/1000ML SOLUTION: Performed by: OBSTETRICS & GYNECOLOGY

## 2017-03-10 PROCEDURE — 80053 COMPREHEN METABOLIC PANEL: CPT | Performed by: OBSTETRICS & GYNECOLOGY

## 2017-03-10 PROCEDURE — 59409 OBSTETRICAL CARE: CPT | Performed by: OBSTETRICS & GYNECOLOGY

## 2017-03-10 RX ORDER — OXYTOCIN/RINGER'S LACTATE 10/500ML
999 PLASTIC BAG, INJECTION (ML) INTRAVENOUS ONCE
Status: COMPLETED | OUTPATIENT
Start: 2017-03-10 | End: 2017-03-10

## 2017-03-10 RX ORDER — HYDROCODONE BITARTRATE AND ACETAMINOPHEN 10; 325 MG/1; MG/1
1 TABLET ORAL EVERY 4 HOURS PRN
Status: DISCONTINUED | OUTPATIENT
Start: 2017-03-10 | End: 2017-03-14 | Stop reason: HOSPADM

## 2017-03-10 RX ORDER — ONDANSETRON 4 MG/1
4 TABLET, ORALLY DISINTEGRATING ORAL EVERY 6 HOURS PRN
Status: DISCONTINUED | OUTPATIENT
Start: 2017-03-10 | End: 2017-03-10 | Stop reason: HOSPADM

## 2017-03-10 RX ORDER — OXYTOCIN/RINGER'S LACTATE 10/500ML
2 PLASTIC BAG, INJECTION (ML) INTRAVENOUS
Status: DISCONTINUED | OUTPATIENT
Start: 2017-03-10 | End: 2017-03-10

## 2017-03-10 RX ORDER — HYDROCODONE BITARTRATE AND ACETAMINOPHEN 5; 325 MG/1; MG/1
1 TABLET ORAL EVERY 4 HOURS PRN
Status: DISCONTINUED | OUTPATIENT
Start: 2017-03-10 | End: 2017-03-14 | Stop reason: HOSPADM

## 2017-03-10 RX ORDER — SODIUM CHLORIDE, SODIUM LACTATE, POTASSIUM CHLORIDE, CALCIUM CHLORIDE 600; 310; 30; 20 MG/100ML; MG/100ML; MG/100ML; MG/100ML
100 INJECTION, SOLUTION INTRAVENOUS CONTINUOUS
Status: DISCONTINUED | OUTPATIENT
Start: 2017-03-10 | End: 2017-03-14 | Stop reason: HOSPADM

## 2017-03-10 RX ORDER — LABETALOL 200 MG/1
200 TABLET, FILM COATED ORAL EVERY 12 HOURS SCHEDULED
Status: DISCONTINUED | OUTPATIENT
Start: 2017-03-10 | End: 2017-03-11

## 2017-03-10 RX ORDER — BISACODYL 10 MG
10 SUPPOSITORY, RECTAL RECTAL DAILY PRN
Status: DISCONTINUED | OUTPATIENT
Start: 2017-03-11 | End: 2017-03-14 | Stop reason: HOSPADM

## 2017-03-10 RX ORDER — LANOLIN 100 %
OINTMENT (GRAM) TOPICAL
Status: DISCONTINUED | OUTPATIENT
Start: 2017-03-10 | End: 2017-03-14 | Stop reason: HOSPADM

## 2017-03-10 RX ORDER — SODIUM CHLORIDE 0.9 % (FLUSH) 0.9 %
1-10 SYRINGE (ML) INJECTION AS NEEDED
Status: DISCONTINUED | OUTPATIENT
Start: 2017-03-10 | End: 2017-03-14 | Stop reason: HOSPADM

## 2017-03-10 RX ORDER — OXYTOCIN/RINGER'S LACTATE 10/500ML
125 PLASTIC BAG, INJECTION (ML) INTRAVENOUS CONTINUOUS PRN
Status: ACTIVE | OUTPATIENT
Start: 2017-03-10 | End: 2017-03-11

## 2017-03-10 RX ORDER — PRENATAL VIT NO.126/IRON/FOLIC 28MG-0.8MG
1 TABLET ORAL DAILY
Status: DISCONTINUED | OUTPATIENT
Start: 2017-03-10 | End: 2017-03-14 | Stop reason: HOSPADM

## 2017-03-10 RX ORDER — HYDROCODONE BITARTRATE AND ACETAMINOPHEN 5; 325 MG/1; MG/1
2 TABLET ORAL EVERY 4 HOURS PRN
Status: DISCONTINUED | OUTPATIENT
Start: 2017-03-10 | End: 2017-03-10 | Stop reason: HOSPADM

## 2017-03-10 RX ORDER — OXYTOCIN/RINGER'S LACTATE 10/500ML
999 PLASTIC BAG, INJECTION (ML) INTRAVENOUS ONCE
Status: DISCONTINUED | OUTPATIENT
Start: 2017-03-10 | End: 2017-03-10

## 2017-03-10 RX ORDER — MAGNESIUM SULFATE HEPTAHYDRATE 40 MG/ML
2 INJECTION, SOLUTION INTRAVENOUS CONTINUOUS
Status: DISPENSED | OUTPATIENT
Start: 2017-03-10 | End: 2017-03-13

## 2017-03-10 RX ORDER — ONDANSETRON 2 MG/ML
4 INJECTION INTRAMUSCULAR; INTRAVENOUS EVERY 6 HOURS PRN
Status: DISCONTINUED | OUTPATIENT
Start: 2017-03-10 | End: 2017-03-14 | Stop reason: HOSPADM

## 2017-03-10 RX ORDER — OXYTOCIN/RINGER'S LACTATE 10/500ML
125 PLASTIC BAG, INJECTION (ML) INTRAVENOUS CONTINUOUS PRN
Status: DISCONTINUED | OUTPATIENT
Start: 2017-03-10 | End: 2017-03-10

## 2017-03-10 RX ORDER — ACETAMINOPHEN 325 MG/1
650 TABLET ORAL EVERY 4 HOURS PRN
Status: DISCONTINUED | OUTPATIENT
Start: 2017-03-10 | End: 2017-03-10 | Stop reason: HOSPADM

## 2017-03-10 RX ORDER — MISOPROSTOL 200 UG/1
800 TABLET ORAL AS NEEDED
Status: DISCONTINUED | OUTPATIENT
Start: 2017-03-10 | End: 2017-03-10 | Stop reason: HOSPADM

## 2017-03-10 RX ORDER — ACETAMINOPHEN 325 MG/1
650 TABLET ORAL EVERY 4 HOURS PRN
Status: DISCONTINUED | OUTPATIENT
Start: 2017-03-10 | End: 2017-03-14 | Stop reason: HOSPADM

## 2017-03-10 RX ORDER — IBUPROFEN 800 MG/1
800 TABLET ORAL EVERY 8 HOURS SCHEDULED
Status: DISCONTINUED | OUTPATIENT
Start: 2017-03-10 | End: 2017-03-13

## 2017-03-10 RX ORDER — CARBOPROST TROMETHAMINE 250 UG/ML
250 INJECTION, SOLUTION INTRAMUSCULAR AS NEEDED
Status: DISCONTINUED | OUTPATIENT
Start: 2017-03-10 | End: 2017-03-10 | Stop reason: HOSPADM

## 2017-03-10 RX ORDER — OXYTOCIN/RINGER'S LACTATE 10/500ML
PLASTIC BAG, INJECTION (ML) INTRAVENOUS
Status: DISPENSED
Start: 2017-03-10 | End: 2017-03-10

## 2017-03-10 RX ORDER — DOCUSATE SODIUM 100 MG/1
100 CAPSULE, LIQUID FILLED ORAL 2 TIMES DAILY
Status: DISCONTINUED | OUTPATIENT
Start: 2017-03-10 | End: 2017-03-14 | Stop reason: HOSPADM

## 2017-03-10 RX ORDER — HYDROCODONE BITARTRATE AND ACETAMINOPHEN 5; 325 MG/1; MG/1
1 TABLET ORAL EVERY 4 HOURS PRN
Status: DISCONTINUED | OUTPATIENT
Start: 2017-03-10 | End: 2017-03-10 | Stop reason: HOSPADM

## 2017-03-10 RX ORDER — ONDANSETRON 2 MG/ML
4 INJECTION INTRAMUSCULAR; INTRAVENOUS EVERY 6 HOURS PRN
Status: DISCONTINUED | OUTPATIENT
Start: 2017-03-10 | End: 2017-03-10 | Stop reason: HOSPADM

## 2017-03-10 RX ORDER — ONDANSETRON 4 MG/1
4 TABLET, FILM COATED ORAL EVERY 6 HOURS PRN
Status: DISCONTINUED | OUTPATIENT
Start: 2017-03-10 | End: 2017-03-10 | Stop reason: HOSPADM

## 2017-03-10 RX ADMIN — HYDROCODONE BITARTRATE AND ACETAMINOPHEN 2 TABLET: 5; 325 TABLET ORAL at 09:53

## 2017-03-10 RX ADMIN — SODIUM CHLORIDE, POTASSIUM CHLORIDE, SODIUM LACTATE AND CALCIUM CHLORIDE 75 ML/HR: 600; 310; 30; 20 INJECTION, SOLUTION INTRAVENOUS at 06:05

## 2017-03-10 RX ADMIN — OXYTOCIN 999 ML/HR: 10 INJECTION, SOLUTION INTRAMUSCULAR; INTRAVENOUS at 00:37

## 2017-03-10 RX ADMIN — Medication 1 TABLET: at 13:45

## 2017-03-10 RX ADMIN — HYDROCODONE BITARTRATE AND ACETAMINOPHEN 1 TABLET: 5; 325 TABLET ORAL at 16:17

## 2017-03-10 RX ADMIN — OXYTOCIN 125 ML/HR: 10 INJECTION, SOLUTION INTRAMUSCULAR; INTRAVENOUS at 01:23

## 2017-03-10 RX ADMIN — LABETALOL HCL 200 MG: 200 TABLET, FILM COATED ORAL at 20:57

## 2017-03-10 RX ADMIN — LABETALOL HCL 200 MG: 200 TABLET, FILM COATED ORAL at 09:53

## 2017-03-10 RX ADMIN — MAGNESIUM SULFATE HEPTAHYDRATE 2 G/HR: 40 INJECTION, SOLUTION INTRAVENOUS at 01:55

## 2017-03-10 RX ADMIN — Medication 10 ML: at 15:09

## 2017-03-10 RX ADMIN — DOCUSATE SODIUM 100 MG: 100 CAPSULE, LIQUID FILLED ORAL at 17:43

## 2017-03-10 RX ADMIN — HYDROCODONE BITARTRATE AND ACETAMINOPHEN 1 TABLET: 5; 325 TABLET ORAL at 20:57

## 2017-03-10 RX ADMIN — IBUPROFEN 800 MG: 800 TABLET ORAL at 13:46

## 2017-03-10 RX ADMIN — HYDROCODONE BITARTRATE AND ACETAMINOPHEN 2 TABLET: 5; 325 TABLET ORAL at 02:46

## 2017-03-10 NOTE — ANESTHESIA PREPROCEDURE EVALUATION
Anesthesia Evaluation     Patient summary reviewed and Nursing notes reviewed   NPO Status: N/A   Airway   Mallampati: I  TM distance: >3 FB  Neck ROM: full  no difficulty expected  Dental    (+) poor dentition    Pulmonary - normal exam   (+) a smoker Current,   Cardiovascular - normal exam    (+) hypertension poorly controlled,       Neuro/Psych- negative ROS  GI/Hepatic/Renal/Endo - negative ROS     Musculoskeletal (-) negative ROS    Abdominal    Substance History - negative use     OB/GYN    (+) Pregnant, Preeclampsia, pregnancy induced hypertension        Other                                    Anesthesia Plan    ASA 3     epidural

## 2017-03-10 NOTE — L&D DELIVERY NOTE
Procedure: Spontaneous vaginal delivery    Surgeon: Bravo Sexton MD    Preop diagnosis: 32 year old  at 37-0/7 weeks gestational age in active labor  2.  Severe gestational hypertension versus severe preeclampsia  3.  Maternal tobacco use  4.  Maternal opioid use    Postop diagnosis: Same    Indications: Patient presented to the hospital complaining of contractions.  She was found to be 6-7 centimeters dilated and completely effaced at the time of admission.  She was admitted for active labor.  She was GBS negative.  She received an epidural.  She progressed along a normal multiparous labor curve.  Fetal heart tones were reassuring throughout labor.  Upon presentation, she had several severe range blood pressures, with both systolic and diastolic elevations.  All her blood pressures while she was in labor and delivery were in the hypertensive range.  Even after control of her pain with the epidural, she continued to have persistent mild hypertensive blood pressures and occasional severe range hypertensive blood pressures.  CBC in copper and metabolic panel were normal.  Urine for protein was still pending at this time.  Patient has had some elevated blood pressures the last week of her pregnancy.  Findings are concerning for either gestational hypertension or preeclampsia with severe features.  She did progress well without augmentation, and by about 12:30 AM, she was clearly dilated, completely effaced, +3 station and feeling an urge to push.  After delivery, patient will be started on magnesium sulfate for seizure prophylaxis for apparent severe preeclampsia versus severe gestational hypertension.    Findings: Female infant, Apgar 9/9, Weight 5 lbs. 4 oz.; no lacerations    Anesthesia: Epidural    EBL: 300 mL    Pathology: Placenta    Complications: None    Procedure: I came to the room when the cervical exam was completely dilated, completely effaced, and +3 station. Under continuous external fetal heart  rate monitoring, the patient was encouraged to push.  She pushed during a single contraction.  With good maternal effort she delivered a viable female infant. The head presented in the occiput anterior presentation and restituted to right occiput transverse. There was no nuchal cord present. The left anterior fetal shoulder was then easily delivered with a gentle downward motion followed by the posterior fetal shoulder, followed by the remainder of the infant without difficulty. The infant was immediately vigorous. The oropharynx and nares were bulb-suction and the cord was clamped roughly 45 seconds following delivery. The cord was cut in between and the infant was handed to the nursery attendants. Cord blood was then collected. The placenta then delivered spontaneously intact, and a three vessel cord was noted. Uterine message and pitocin 20 units IV was given until the fundus was firm. The cervix, vagina, perineum, and rectum were carefully inspected for lacerations and none were noted. Counts for needles, sponges, laps and instruments were correct times two at the end of the delivery. There were no sponges left in the vagina. I was present and scrubbed for the entire delivery. There were no major complications. Mother and baby were bonding well at the end of the delivery.  Mother will be started on magnesium sulfate in the postpartum setting for 24 hours for seizure prophylaxis secondary to severe gestational hypertension versus severe preeclampsia.

## 2017-03-10 NOTE — PLAN OF CARE
Problem: Patient Care Overview (Adult)  Goal: Plan of Care Review  Outcome: Ongoing (interventions implemented as appropriate)    03/10/17 0257   Coping/Psychosocial Response Interventions   Plan Of Care Reviewed With patient   Patient Care Overview   Progress progress toward functional goals as expected       Goal: Adult Individualization and Mutuality  Outcome: Ongoing (interventions implemented as appropriate)    03/10/17 0257   Individualization   Patient Specific Preferences Bottlefeeding, pain management   Patient Specific Goals Baby to stay with mom while in labor and delivery   Patient Specific Interventions Pain managment       Goal: Discharge Needs Assessment  Outcome: Ongoing (interventions implemented as appropriate)    Problem: Labor (Cervical Ripen, Induct, Augment) (Adult,Obstetrics,Pediatric)  Goal: Signs and Symptoms of Listed Potential Problems Will be Absent or Manageable (Labor)  Outcome: Outcome(s) achieved Date Met:  03/10/17    03/10/17 0257   Labor (Cervical Ripen, Induct, Augment)   Problems Assessed (Labor) all   Problems Present (Labor) none         Problem: Postpartum, Vaginal Delivery (Adult)  Goal: Signs and Symptoms of Listed Potential Problems Will be Absent or Manageable (Postpartum, Vaginal Delivery)  Outcome: Ongoing (interventions implemented as appropriate)    03/10/17 0257   Postpartum, Vaginal Delivery   Problems Assessed (Postpartum Vaginal Delivery) all   Problems Present (Postpartum Vaginal Delivery) pain

## 2017-03-10 NOTE — PROGRESS NOTES
Postpartum Progress Note      Status post Vaginal Deliver: Doing well postoperatively.     1) Hypertension without significant proteinuria postpartum. No headache, visual changes or RUQ pain. Her labs show no evidence of HELLP.  At this point on Magnesium for seizure prophylaxis and is stable.  Wants to discontinue at 12 hours, so will start anti-hypertensive (she has done something similar with last two pregnancies).  Will start labetalol.      Rh status: O positive  Rubella: immune  Gender: Female     Subjective     Postpartum Day 1: Vaginal delivery    The patient feels well. The patient denies emotional concerns. Pain is well controlled with current medications. The baby is well. The patient is not ambulating well. The patient is not tolerating a normal diet.     Objective     Vital signs in last 24 hours:  Temp:  [98 °F (36.7 °C)-99 °F (37.2 °C)] 98 °F (36.7 °C)  Heart Rate:  [] 74  Resp:  [18] 18  BP: (134-189)/() 166/95      General:    alert, appears stated age and cooperative   Abdomen:  Soft, Non-tender    Lochia:  appropriate   Uterine Fundus:   firm   Ext    Edema 1+, DTRs 3+, no clonus    DVT Evaluation:  No evidence of DVT seen on physical exam.     Lab Results   Component Value Date    WBC 9.91 03/10/2017    HGB 10.5 (L) 03/10/2017    HCT 33.4 (L) 03/10/2017    MCV 80.5 03/10/2017     03/10/2017       Matthew Allred MD  3/10/2017  8:59 AM

## 2017-03-10 NOTE — PROGRESS NOTES
Addendum:    Vitals:    03/09/17 2030 03/09/17 2045 03/09/17 2100 03/09/17 2115   BP: (!) 189/163 170/99 171/98 (!) 164/103   Pulse: 82 84 89 84       Patient with several severe range blood pressures since admission.  May be secondary to patient's pain with contractions.  If her severe range blood pressures persist following the epidural, we will start magnesium sulfate for seizure prophylaxis for severe gestational hypertension versus severe preeclampsia.  We will obtain urine for protein as well.  Still waiting comp metabolic profile results and urine drug screen results.  Platelets are normal.

## 2017-03-10 NOTE — PROGRESS NOTES
"Continued Stay Note  [facility]     Patient Name: Johanna Moura  MRN: [id number]  Today's Date: 3/10/2017    Admit Date: 3/9/2017          Discharge Plan       03/10/17 1609    Case Management/Social Work Plan    Additional Comments Mother is Johanna Moura (MRN [id number]). Baby is Isa Moura (Claudia Moura, MRN [id number]). CCP consulted due to \"mom positive prenatal drug screen for opiates.\" Per chart review, mother's UA positive for opiates and oxycodone on admission. Baby UA collection pending due to contamination with meconium on initial attempt. Meconium pending. Per baby's nurse (Barbara), report made to CPS due to mother's h/o positive drug screens during prenatal visits (Lis, [phone], ref id: [id number]). Per CPS hotline (Whitney, [phone]), mother had no prior active case, and current case accepted and assigned to Supervisor La Nena Hinkle ([phone]) and  Lisette Matson ([phone]). [facility] met with mother to verify information and assess for resource/social service needs. Mother reports this is her 4th child, and states that she has custody of her three older children (ages 8, 9, and 15 y/o). Mother states father of baby does not plan to be involved with her or baby, and declines to provide his contact information. Mother reports maternal grandmother (Marisela Flores, [phone]) stays with her regularly and is currently caring for other children while she is admitted. Mother reports having car seat, crib, clothing, and all necessary items to care for baby. Mother reports contacting [employer] to add baby to her insurance today. Mother reports active SNAP and WIC benefits, and states plan to add baby to her WIC benefit upon d/c. Mother states that her positive UA for oxycodone/opiates this admission and in prenatal visits due to taking prescribed medication after having two teeth pulled at [facility] in the end of January/beginning of February. Mother denies additional substance " use. Per mother's nurse (Linsey) and baby's nurse (Barbara), mother's Reynaldo negative for prescriptions. Mother denies additional needs. CCP met with CPS CW following her meeting with mother and baby 3/10. Per CPS CW, CPS will investigate both validity of prescribed medications and wellbeing of mother's other children before making a prevention plan. Per baby's nurse (Barbara), baby will be admitted for a minimum for 5 days to monitor for withdrawal symptoms. CCP to follow up with CPS Monday, 3/13 for CPS determination of baby's discharge disposition. Christiana Larsen LCSW              Discharge Codes     None            Whitney Larsen LCSW

## 2017-03-10 NOTE — H&P
Chief complaint: Contractions  History present illness: Patient presents with contractions beginning this morning, and becoming more intense and more frequent.  Denies vaginal bleeding or leakage of fluid.  Normal fetal movement.  This pregnancies been uncomplicated by hydrocodone use in the third trimester secondary to dental issues.  She is also had some intermittent elevated blood pressures in the third trimester.    Past Medical History   Diagnosis Date   • Preeclampsia      Past Surgical History   Procedure Laterality Date   • Teeth extraction            Family History   Problem Relation Age of Onset   • No Known Problems Father    • Hypertension Mother    • Colon cancer Brother    • Cancer Maternal Grandmother      Social History   Substance Use Topics   • Smoking status: Current Every Day Smoker     Packs/day: 0.25     Types: Cigarettes   • Smokeless tobacco: Never Used   • Alcohol use No     Meds:  Norco 7.5 mg prn  Promethazine    No Known Allergies     ROS: 10 systems neg except as per HPI    PE: There were no vitals filed for this visit.   General: No acute distress, awake and oriented ×3  Lungs: Clear to auscultation bilaterally  Cardiovascular: Regular rate and rhythm  Abdomen: Soft, nontender, nondistended, normoactive bowel sounds, EFW 6 pounds  Cvx: 6.5 cm/100% effaced/-1 per RN  Extremities: No Tenderness, no Homans sign, no lower extremity edema    NST: 140/reactive/moderate variability/no decelerations  Tocometry: Difficult to monitor, about every 2-4 minutes by history    Labs: GBS negative    Imaging: Limited bedside ultrasound by me today confirms cephalic presentation.    Assessment:  1.  32-year-old  4 para 3 at 36-6/7 weeks gestational age in active spontaneous labor  2.  Fetal heart tones category 1  3.  GBS negative  4.  Hydrocodone use  5.  Elevated blood pressures, possibly gestational hypertension versus undiagnosed chronic hypertension    Plan:  1.  Admit to labor and  delivery for active labor.  GBS is negative.  Fetal heart tones category 1.  Patient may have an epidural.  Obtain routine admit labs plus, with a metabolic profile for history of elevated blood pressures and urine drug screen for history of hydrocodone use.  2.  Plan of care discussed with the patient.  Anticipate vaginal delivery.

## 2017-03-10 NOTE — ANESTHESIA PROCEDURE NOTES
Labor Epidural    Patient location during procedure: OB  Start Time: 3/9/2017 9:41 PM  Performed By  Anesthesiologist: SHERRI TURK  Preanesthetic Checklist  Completed: patient identified, site marked, surgical consent, pre-op evaluation, timeout performed, IV checked, risks and benefits discussed and monitors and equipment checked  Epidural Block Prep:  Pt Position:sitting  Sterile Tech:cap, gloves, sterile barrier and mask  Prep:povidone-iodine 7.5% surgical scrub  Monitoring:blood pressure monitoring, continuous pulse oximetry and EKG  Epidural Block Procedure:  Approach:midline  Guidance:landmark technique and palpation technique  Location:L3-L4  Needle Type:Tuohy  Needle Gauge:17  Paresthesia: none  Aspiration:negative  Test Dose:negative  Post Assessment:  Dressing:occlusive dressing applied and secured with tape  Pt Tolerance:patient tolerated the procedure well with no apparent complications  Complications:no

## 2017-03-11 LAB
BASOPHILS # BLD AUTO: 0 10*3/MM3 (ref 0–0.2)
BASOPHILS NFR BLD AUTO: 0 % (ref 0–1.5)
DEPRECATED RDW RBC AUTO: 37.7 FL (ref 37–54)
EOSINOPHIL # BLD AUTO: 0 10*3/MM3 (ref 0–0.7)
EOSINOPHIL NFR BLD AUTO: 0 % (ref 0.3–6.2)
ERYTHROCYTE [DISTWIDTH] IN BLOOD BY AUTOMATED COUNT: 13 % (ref 11.7–13)
HCT VFR BLD AUTO: 30.4 % (ref 35.6–45.5)
HGB BLD-MCNC: 9.7 G/DL (ref 11.9–15.5)
IMM GRANULOCYTES # BLD: 0 10*3/MM3 (ref 0–0.03)
IMM GRANULOCYTES NFR BLD: 0 % (ref 0–0.5)
LYMPHOCYTES # BLD AUTO: 1.62 10*3/MM3 (ref 0.9–4.8)
LYMPHOCYTES NFR BLD AUTO: 20.5 % (ref 19.6–45.3)
MCH RBC QN AUTO: 25.5 PG (ref 26.9–32)
MCHC RBC AUTO-ENTMCNC: 31.9 G/DL (ref 32.4–36.3)
MCV RBC AUTO: 79.8 FL (ref 80.5–98.2)
MONOCYTES # BLD AUTO: 0.52 10*3/MM3 (ref 0.2–1.2)
MONOCYTES NFR BLD AUTO: 6.6 % (ref 5–12)
NEUTROPHILS # BLD AUTO: 5.77 10*3/MM3 (ref 1.9–8.1)
NEUTROPHILS NFR BLD AUTO: 72.9 % (ref 42.7–76)
PLATELET # BLD AUTO: 198 10*3/MM3 (ref 140–500)
PMV BLD AUTO: 10.9 FL (ref 6–12)
RBC # BLD AUTO: 3.81 10*6/MM3 (ref 3.9–5.2)
WBC NRBC COR # BLD: 7.91 10*3/MM3 (ref 4.5–10.7)

## 2017-03-11 PROCEDURE — 99231 SBSQ HOSP IP/OBS SF/LOW 25: CPT | Performed by: OBSTETRICS & GYNECOLOGY

## 2017-03-11 PROCEDURE — 85025 COMPLETE CBC W/AUTO DIFF WBC: CPT | Performed by: OBSTETRICS & GYNECOLOGY

## 2017-03-11 RX ORDER — LABETALOL 300 MG/1
300 TABLET, FILM COATED ORAL EVERY 12 HOURS SCHEDULED
Status: DISCONTINUED | OUTPATIENT
Start: 2017-03-11 | End: 2017-03-14 | Stop reason: HOSPADM

## 2017-03-11 RX ORDER — NICOTINE 21 MG/24HR
1 PATCH, TRANSDERMAL 24 HOURS TRANSDERMAL EVERY 24 HOURS
Status: DISCONTINUED | OUTPATIENT
Start: 2017-03-11 | End: 2017-03-14 | Stop reason: HOSPADM

## 2017-03-11 RX ADMIN — DOCUSATE SODIUM 100 MG: 100 CAPSULE, LIQUID FILLED ORAL at 18:47

## 2017-03-11 RX ADMIN — HYDROCODONE BITARTRATE AND ACETAMINOPHEN 1 TABLET: 5; 325 TABLET ORAL at 22:41

## 2017-03-11 RX ADMIN — HYDROCODONE BITARTRATE AND ACETAMINOPHEN 1 TABLET: 5; 325 TABLET ORAL at 15:33

## 2017-03-11 RX ADMIN — HYDROCODONE BITARTRATE AND ACETAMINOPHEN 1 TABLET: 5; 325 TABLET ORAL at 01:10

## 2017-03-11 RX ADMIN — IBUPROFEN 800 MG: 800 TABLET ORAL at 18:47

## 2017-03-11 RX ADMIN — HYDROCODONE BITARTRATE AND ACETAMINOPHEN 1 TABLET: 5; 325 TABLET ORAL at 09:46

## 2017-03-11 RX ADMIN — IBUPROFEN 800 MG: 800 TABLET ORAL at 01:10

## 2017-03-11 RX ADMIN — LABETALOL HCL 300 MG: 300 TABLET, FILM COATED ORAL at 22:35

## 2017-03-11 RX ADMIN — NICOTINE 1 PATCH: 21 PATCH, EXTENDED RELEASE TRANSDERMAL at 22:35

## 2017-03-11 RX ADMIN — HYDROCODONE BITARTRATE AND ACETAMINOPHEN 1 TABLET: 5; 325 TABLET ORAL at 05:50

## 2017-03-11 RX ADMIN — DOCUSATE SODIUM 100 MG: 100 CAPSULE, LIQUID FILLED ORAL at 09:46

## 2017-03-11 RX ADMIN — Medication 1 TABLET: at 09:46

## 2017-03-11 RX ADMIN — LABETALOL HCL 200 MG: 200 TABLET, FILM COATED ORAL at 09:46

## 2017-03-11 RX ADMIN — IBUPROFEN 800 MG: 800 TABLET ORAL at 09:46

## 2017-03-11 NOTE — PROGRESS NOTES
"[facility]  Vaginal Delivery Progress Note    Subjective   Subjective  Postpartum Day 1: Vaginal Delivery    The patient feels well.  Her pain is well controlled with nonsteroidal anti-inflammatory drugs and opioid analgesics.   She is ambulating well.  Patient describes her bleeding as thin lochia.    Breastfeeding: has not attempted yet.    Objective     Objective   Vital Signs Range for the last 24 hours  Temperature: Temp:  [97.6 °F (36.4 °C)-99.2 °F (37.3 °C)] 98 °F (36.7 °C)   Temp Source: Temp src: Oral   BP: BP: (134-167)/() 152/93   Pulse: Heart Rate:  [56-88] 62   Respirations: Resp:  [16-18] 16   SPO2: SpO2:  [99 %] 99 %   O2 Amount (l/min):     O2 Devices O2 Device: room air   Weight:       Admit Height:  Height: 68\" (172.7 cm)    Physical Exam:  General:  no acute distresss.  Abdomen: abdomen is soft without significant tenderness, masses, organomegaly or guarding. Fundus: appropriate, firm, non tender  Extremities: normal, atraumatic, no cyanosis, and trace edema.       Lab results reviewed:  Yes   Rubella:  No results found for: RUBELLAIGGIN Nurse Transcribed from prenatal record --  No components found for: EXTRUBELQUAL  Rh Status:    RH TYPE   Date Value Ref Range Status   03/09/2017 Positive  Final     Immunizations:   There is no immunization history on file for this patient.    Assessment/Plan   Assessment & Plan  Principal Problem:    Normal delivery at term  Active Problems:    Tobacco smoking affecting pregnancy in third trimester    Pre-eclampsia, severe, delivered      Johanna Moura is Day 1  post-partum  Vaginal, Spontaneous Delivery    .      Plan:  Continue current care ,watch BPs, poss home in am.      Jonatan Ortiz MD  3/11/2017  1:05 PM    "

## 2017-03-11 NOTE — PLAN OF CARE
Problem: Patient Care Overview (Adult)  Goal: Plan of Care Review  Outcome: Ongoing (interventions implemented as appropriate)    03/11/17 0525   Coping/Psychosocial Response Interventions   Plan Of Care Reviewed With patient   Patient Care Overview   Progress improving   Outcome Evaluation   Outcome Summary/Follow up Plan pt doing well, pain controlled with PO pain meds, up and ambulating, VSS bld pressure has improved, pt discouraged from going outside to smoke, baby in NICU        Goal: Adult Individualization and Mutuality  Outcome: Ongoing (interventions implemented as appropriate)  Goal: Discharge Needs Assessment  Outcome: Ongoing (interventions implemented as appropriate)    03/11/17 0525   Discharge Needs Assessment   Concerns To Be Addressed no discharge needs identified   Readmission Within The Last 30 Days no previous admission in last 30 days   Equipment Needed After Discharge none   Discharge Disposition home or self-care   Current Health   Anticipated Changes Related to Illness none   Self-Care   Equipment Currently Used at Home none   Living Environment   Transportation Available none         Problem: Postpartum, Vaginal Delivery (Adult)  Goal: Signs and Symptoms of Listed Potential Problems Will be Absent or Manageable (Postpartum, Vaginal Delivery)  Outcome: Outcome(s) achieved Date Met:  03/11/17 03/11/17 0525   Postpartum, Vaginal Delivery   Problems Present (Postpartum Vaginal Delivery) none

## 2017-03-11 NOTE — NURSING NOTE
Patient notified this RN and unit secretary at approximately 1630 of plans to show her mother to the NICU in order to see her baby. Patient stated she would return immediately to her room after doing so. Patient did not return to unit until 1720. Advised patient to return to room so her BP could be checked again.

## 2017-03-11 NOTE — PLAN OF CARE
Problem: Patient Care Overview (Adult)  Goal: Plan of Care Review  Outcome: Ongoing (interventions implemented as appropriate)    03/10/17 1939   Coping/Psychosocial Response Interventions   Plan Of Care Reviewed With patient   Patient Care Overview   Progress progress toward functional goals as expected       Goal: Adult Individualization and Mutuality  Outcome: Ongoing (interventions implemented as appropriate)  Goal: Discharge Needs Assessment  Outcome: Ongoing (interventions implemented as appropriate)

## 2017-03-11 NOTE — PLAN OF CARE
Problem: Postpartum, Vaginal Delivery (Adult)  Goal: Signs and Symptoms of Listed Potential Problems Will be Absent or Manageable (Postpartum, Vaginal Delivery)  Outcome: Ongoing (interventions implemented as appropriate)    03/10/17 1938   Postpartum, Vaginal Delivery   Problems Assessed (Postpartum Vaginal Delivery) all   Problems Present (Postpartum Vaginal Delivery) none

## 2017-03-11 NOTE — NURSING NOTE
Patient returned to room from NICU, crying and upset. Vitals obtained. BP elevated. Advised patient to lay down so BP could be re-checked in 15 minutes. Patient insisted on showering first, and requested pain medication. Medicated patient and instructed her to call when she got back in bed.

## 2017-03-12 PROCEDURE — 90791 PSYCH DIAGNOSTIC EVALUATION: CPT

## 2017-03-12 PROCEDURE — 99231 SBSQ HOSP IP/OBS SF/LOW 25: CPT | Performed by: OBSTETRICS & GYNECOLOGY

## 2017-03-12 RX ORDER — NIFEDIPINE 30 MG/1
30 TABLET, EXTENDED RELEASE ORAL
Status: DISCONTINUED | OUTPATIENT
Start: 2017-03-12 | End: 2017-03-14 | Stop reason: HOSPADM

## 2017-03-12 RX ADMIN — DOCUSATE SODIUM 100 MG: 100 CAPSULE, LIQUID FILLED ORAL at 18:42

## 2017-03-12 RX ADMIN — IBUPROFEN 800 MG: 800 TABLET ORAL at 04:11

## 2017-03-12 RX ADMIN — DOCUSATE SODIUM 100 MG: 100 CAPSULE, LIQUID FILLED ORAL at 08:55

## 2017-03-12 RX ADMIN — LABETALOL HCL 300 MG: 300 TABLET, FILM COATED ORAL at 21:52

## 2017-03-12 RX ADMIN — IBUPROFEN 800 MG: 800 TABLET ORAL at 12:43

## 2017-03-12 RX ADMIN — LABETALOL HCL 300 MG: 300 TABLET, FILM COATED ORAL at 08:55

## 2017-03-12 RX ADMIN — HYDROCODONE BITARTRATE AND ACETAMINOPHEN 1 TABLET: 5; 325 TABLET ORAL at 04:11

## 2017-03-12 RX ADMIN — HYDROCODONE BITARTRATE AND ACETAMINOPHEN 1 TABLET: 5; 325 TABLET ORAL at 12:43

## 2017-03-12 RX ADMIN — Medication 1 TABLET: at 08:55

## 2017-03-12 RX ADMIN — NIFEDIPINE 30 MG: 30 TABLET, FILM COATED, EXTENDED RELEASE ORAL at 13:17

## 2017-03-12 RX ADMIN — IBUPROFEN 800 MG: 800 TABLET ORAL at 18:42

## 2017-03-12 RX ADMIN — NICOTINE 1 PATCH: 21 PATCH, EXTENDED RELEASE TRANSDERMAL at 21:53

## 2017-03-12 RX ADMIN — HYDROCODONE BITARTRATE AND ACETAMINOPHEN 1 TABLET: 5; 325 TABLET ORAL at 21:52

## 2017-03-12 NOTE — PLAN OF CARE
Problem: Patient Care Overview (Adult)  Goal: Plan of Care Review  Outcome: Ongoing (interventions implemented as appropriate)    03/12/17 0104   Coping/Psychosocial Response Interventions   Plan Of Care Reviewed With patient   Patient Care Overview   Progress improving   Outcome Evaluation   Outcome Summary/Follow up Plan pain controlled with pain meds, BP elevated but improving, encouraged calm environment and encouraged Bed rest with BR priv. as per Dr. Ortiz, nicotine patch ordered and increased labeletol dose        Goal: Adult Individualization and Mutuality  Outcome: Ongoing (interventions implemented as appropriate)  Goal: Discharge Needs Assessment  Outcome: Ongoing (interventions implemented as appropriate)    03/12/17 0104   Discharge Needs Assessment   Concerns To Be Addressed no discharge needs identified   Readmission Within The Last 30 Days no previous admission in last 30 days   Equipment Needed After Discharge none   Discharge Disposition home or self-care   Current Health   Anticipated Changes Related to Illness none   Self-Care   Equipment Currently Used at Home none   Living Environment   Transportation Available none         Problem: Postpartum, Vaginal Delivery (Adult)  Goal: Signs and Symptoms of Listed Potential Problems Will be Absent or Manageable (Postpartum, Vaginal Delivery)  Outcome: Ongoing (interventions implemented as appropriate)    03/12/17 0104   Postpartum, Vaginal Delivery   Problems Assessed (Postpartum Vaginal Delivery) all   Problems Present (Postpartum Vaginal Delivery) none

## 2017-03-12 NOTE — NURSING NOTE
Dr. Ortiz called back and new orders obtained for patient to be on bedrest with BR privileges.Nicotine patch was ordered and labetalol was increased to 300mg BID next dose tonight at 2100. Will CTM and call if patient not improving. Also notified that patient has been off unit since start of shift.

## 2017-03-12 NOTE — NURSING NOTE
Went to assess patient and have her lie down on her left side to re-assess BP. Patient is currently off unit. Will assess patient when returns to the floor. Call made to Dr. Ortiz to notify of patient's persistent HTN.

## 2017-03-12 NOTE — PROGRESS NOTES
Vital:   Vitals:    03/12/17 0831   BP: 149/82   Pulse: 65   Resp: 16   Temp: 98.1 °F (36.7 °C)   SpO2:     BP now 171/103     DTRs 3+ without clonus   No edema    Lochia Scant  Episiotomy: None  Hemoglobin:    No results found for this or any previous visit (from the past 24 hour(s)).    Cc No c/o headache etc    Blood type: O  +    Rubella: Immune   Impression  1. PPD #2- Vaginal Delivery    2. Persistent high BP on Labetalol 300 mg BID     Plan  1. Add Procardia XL 30

## 2017-03-12 NOTE — CONSULTS
Pt got referral because baby is going through withdrawals in NICU.  CPS has been notified.  Pt reported that she had two teeth pulled in late Jan/early Feb and had received pain pills then.  Reynaldo done with no opiate or oxycodone Rx's, but pt tested positive for both.  Pt adamantly denies drug or ETOH use.  Pt does smoke heavily and is often off the floor.  Nurse says that pt has very few belongings so it would be hard to hide something in the room, but nursing staff have repeatedly had to ask her to stay in her room.    Pt has 3 other children, 14, 7 and 9 y/o that her mother is taking care of.  Pt reported that father was not involved.  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  Pt hopes to return to work at some point in the future.  Pt is [employer] and reports that she has had no hobbies other than taking care of her children, which keep her very busy.    Pt did say that she could recognize some signs, at times, of postpartum depression because she had post partum when her oldest son was born.  Discussed with pt that I would talk to nurse about pt talking to dr about starting an anti-depressant.  Also giving disposition for [employer].  Pt seemed appreciative.    No HI/SI reported.  Alert and fully oriented.  No A/V hallucinations.  Blood pressure has continued to be an issue in hospital.  Access will sign off. Please contact if we are needed further.

## 2017-03-12 NOTE — PLAN OF CARE
Problem: Patient Care Overview (Adult)  Goal: Plan of Care Review  Outcome: Ongoing (interventions implemented as appropriate)    03/11/17 0944 03/11/17 1900   Coping/Psychosocial Response Interventions   Plan Of Care Reviewed With patient --    Patient Care Overview   Progress --  no change   Outcome Evaluation   Outcome Summary/Follow up Plan --  pain control improving, BP still high, advised patient to stay in room to rest and not leave floor to smoke, ambulating, visiting baby in NICU       Goal: Adult Individualization and Mutuality  Outcome: Ongoing (interventions implemented as appropriate)  Goal: Discharge Needs Assessment  Outcome: Ongoing (interventions implemented as appropriate)    Problem: Postpartum, Vaginal Delivery (Adult)  Goal: Signs and Symptoms of Listed Potential Problems Will be Absent or Manageable (Postpartum, Vaginal Delivery)  Outcome: Ongoing (interventions implemented as appropriate)

## 2017-03-13 PROCEDURE — 99232 SBSQ HOSP IP/OBS MODERATE 35: CPT | Performed by: OBSTETRICS & GYNECOLOGY

## 2017-03-13 RX ORDER — ACETAMINOPHEN 325 MG/1
650 TABLET ORAL EVERY 4 HOURS PRN
Status: DISCONTINUED | OUTPATIENT
Start: 2017-03-13 | End: 2017-03-14 | Stop reason: HOSPADM

## 2017-03-13 RX ADMIN — LABETALOL HCL 300 MG: 300 TABLET, FILM COATED ORAL at 21:06

## 2017-03-13 RX ADMIN — DOCUSATE SODIUM 100 MG: 100 CAPSULE, LIQUID FILLED ORAL at 09:57

## 2017-03-13 RX ADMIN — NICOTINE 1 PATCH: 21 PATCH, EXTENDED RELEASE TRANSDERMAL at 21:06

## 2017-03-13 RX ADMIN — IBUPROFEN 800 MG: 800 TABLET ORAL at 09:57

## 2017-03-13 RX ADMIN — LABETALOL HCL 300 MG: 300 TABLET, FILM COATED ORAL at 09:58

## 2017-03-13 RX ADMIN — NIFEDIPINE 30 MG: 30 TABLET, FILM COATED, EXTENDED RELEASE ORAL at 09:59

## 2017-03-13 RX ADMIN — HYDROCODONE BITARTRATE AND ACETAMINOPHEN 1 TABLET: 5; 325 TABLET ORAL at 19:34

## 2017-03-13 RX ADMIN — DOCUSATE SODIUM 100 MG: 100 CAPSULE, LIQUID FILLED ORAL at 18:02

## 2017-03-13 RX ADMIN — Medication 1 TABLET: at 09:57

## 2017-03-13 NOTE — PROGRESS NOTES
CC: Patient is postpartum day number 3.  S: Patient without complaints.  No events overnight.  She is tolerating a regular diet.  She is ambulating and voiding without difficulty.  Lochia is minimal. Plans to breast feed.  Baby is in NICU for  abstinence syndrome.  Patient denies headache or visual changes.    O:   Vitals:    17 1916 17 2325 17 0459 17 0740   BP: 164/95 167/96 156/92 156/97   BP Location: Right arm Left arm Left arm Left arm   Patient Position: Lying Sitting Sitting Sitting   Pulse: 64 109 98 58   Resp: 16 18 18 16   Temp: 97.9 °F (36.6 °C) 97.3 °F (36.3 °C) 98.2 °F (36.8 °C) 97.5 °F (36.4 °C)   TempSrc: Oral Oral Oral Oral   SpO2:       Height:       General: No acute distress, awake and oriented ×3  Fundus: Firm, nontender, below the umbilicus  Abdomen: Soft, nontender, nondistended  Extremities: No calf tenderness, no Homans sign, 1+ lower extremity edema      Results from last 7 days  Lab Units 03/10/17  0600 17   SODIUM mmol/L 139 138   BUN mg/dL 4* 7   CREATININE mg/dL 0.60 0.69   GLUCOSE mg/dL 74 87         Estimated Creatinine Clearance: 137 mL/min (by C-G formula based on Cr of 0.6).    Results from last 7 days  Lab Units 17  0559 03/10/17  0600 17   WBC 10*3/mm3 7.91 9.91 9.01   HEMOGLOBIN g/dL 9.7* 10.5* 11.0*   PLATELETS 10*3/mm3 198 199 211           Results from last 7 days  Lab Units 03/10/17  0600 17   ALT (SGPT) U/L 6 8   AST (SGOT) U/L 21 21       Rh+, rubella immune, female infant    Scheduled Meds:  docusate sodium 100 mg Oral BID   ibuprofen 800 mg Oral Q8H   labetalol 300 mg Oral Q12H   nicotine 1 patch Transdermal Q24H   NIFEdipine XL 30 mg Oral Q24H   prenatal (CLASSIC) vitamin 1 tablet Oral Daily     Continuous Infusions:  lactated ringers 100 mL/hr Last Rate: Stopped (03/10/17 1340)     PRN Meds:.•  acetaminophen  •  all purpose nipple ointment  •  benzocaine  •  benzocaine-lanolin-aloe vera  •   bisacodyl  •  HYDROcodone-acetaminophen  •  HYDROcodone-acetaminophen  •  hydrocortisone  •  lanolin  •  magnesium hydroxide  •  ondansetron  •  pramoxine-hydrocortisone  •  sodium chloride    Assessment:  1.  32-year-old  4 para 4 status post spontaneous vaginal delivery, postpartum day #3  2.  Preeclampsia versus gestational hypertension, severe, postpartum, blood pressure still poorly controlled  3.  Opioid use during pregnancy, postpartum    Plan:  1.  Patient's blood pressures are still poorly controlled.  Procardia XL was added yesterday.  We will see how this helps her blood pressures today.  I will also stop her NSAIDs, to see if this helps decrease her blood pressures.  Patient will need to stay at least another day for better blood pressure control.  2.  I spent 25 minutes on the floor in the care of this patient (reviewing the chart, consult other physicians, direct patient care), with greater than 50 percent this time in direct counseling/coordination with the patient and her family.

## 2017-03-13 NOTE — PLAN OF CARE
Problem: Patient Care Overview (Adult)  Goal: Plan of Care Review  Outcome: Ongoing (interventions implemented as appropriate)  Goal: Adult Individualization and Mutuality  Outcome: Ongoing (interventions implemented as appropriate)  Goal: Discharge Needs Assessment  Outcome: Ongoing (interventions implemented as appropriate)    Problem: Postpartum, Vaginal Delivery (Adult)  Goal: Signs and Symptoms of Listed Potential Problems Will be Absent or Manageable (Postpartum, Vaginal Delivery)  Outcome: Ongoing (interventions implemented as appropriate)

## 2017-03-14 VITALS
HEIGHT: 68 IN | RESPIRATION RATE: 18 BRPM | OXYGEN SATURATION: 98 % | TEMPERATURE: 98.1 F | SYSTOLIC BLOOD PRESSURE: 152 MMHG | DIASTOLIC BLOOD PRESSURE: 95 MMHG | HEART RATE: 69 BPM

## 2017-03-14 PROCEDURE — 99238 HOSP IP/OBS DSCHRG MGMT 30/<: CPT | Performed by: OBSTETRICS & GYNECOLOGY

## 2017-03-14 RX ORDER — NIFEDIPINE 30 MG/1
30 TABLET, FILM COATED, EXTENDED RELEASE ORAL
Qty: 30 TABLET | Refills: 0 | Status: SHIPPED | OUTPATIENT
Start: 2017-03-14

## 2017-03-14 RX ORDER — LABETALOL 300 MG/1
300 TABLET, FILM COATED ORAL EVERY 12 HOURS SCHEDULED
Qty: 60 TABLET | Refills: 0 | Status: SHIPPED | OUTPATIENT
Start: 2017-03-14

## 2017-03-14 RX ORDER — HYDROCODONE BITARTRATE AND ACETAMINOPHEN 10; 325 MG/1; MG/1
1 TABLET ORAL EVERY 4 HOURS PRN
Qty: 30 TABLET | Refills: 0 | Status: SHIPPED | OUTPATIENT
Start: 2017-03-14 | End: 2017-03-20

## 2017-03-14 RX ORDER — NIFEDIPINE 30 MG/1
30 TABLET, FILM COATED, EXTENDED RELEASE ORAL
Qty: 30 TABLET | Refills: 0 | Status: SHIPPED | OUTPATIENT
Start: 2017-03-14 | End: 2017-03-14

## 2017-03-14 RX ADMIN — HYDROCODONE BITARTRATE AND ACETAMINOPHEN 1 TABLET: 5; 325 TABLET ORAL at 00:47

## 2017-03-14 RX ADMIN — LABETALOL HCL 300 MG: 300 TABLET, FILM COATED ORAL at 08:24

## 2017-03-14 RX ADMIN — Medication 1 TABLET: at 08:24

## 2017-03-14 RX ADMIN — DOCUSATE SODIUM 100 MG: 100 CAPSULE, LIQUID FILLED ORAL at 08:24

## 2017-03-14 RX ADMIN — NIFEDIPINE 30 MG: 30 TABLET, FILM COATED, EXTENDED RELEASE ORAL at 08:24

## 2017-03-14 RX ADMIN — HYDROCODONE BITARTRATE AND ACETAMINOPHEN 1 TABLET: 5; 325 TABLET ORAL at 07:01

## 2017-03-14 NOTE — PROGRESS NOTES
Postpartum Progress Note      Status post Vaginal Delivery: Doing well postoperatively.     Preeclampsia--blood pressures in mild range on procardia and labetalol.  Patient asymptomatic.  Will discharge home on blood pressure meds with instructions to followup in office later this week or beginning of next week for blood pressure check.    Rh status: O positive  Rubella: Immune  Gender: Female--in NICU for DOE    Subjective     Postpartum Day 4: Vaginal delivery    The patient feels well. The patient denies emotional concerns. Pain is well controlled with current medications. The baby iswell. The patient is ambulating well. The patient is tolerating a normal diet.     Objective     Vital signs in last 24 hours:  Temp:  [97.4 °F (36.3 °C)-98.1 °F (36.7 °C)] 98.1 °F (36.7 °C)  Heart Rate:  [61-78] 69  Resp:  [16-18] 18  BP: (148-159)/(79-98) 152/95      General:    alert, appears stated age and cooperative   Abdomen:  Soft, Non-tender    Lochia:  appropriate   Uterine Fundus:   firm   Ext    Edema none, 2+ reflexes, no clonus   DVT Evaluation:  No evidence of DVT seen on physical exam.     Lab Results   Component Value Date    WBC 7.91 03/11/2017    HGB 9.7 (L) 03/11/2017    HCT 30.4 (L) 03/11/2017    MCV 79.8 (L) 03/11/2017     03/11/2017       Sydney Gutierrez MD  3/14/2017  10:05 AM

## 2017-03-14 NOTE — DISCHARGE SUMMARY
Date of Discharge:  3/14/2017    Discharge Diagnosis: Status post vaginal delivery, preeclampsia    Presenting Problem/History of Present Illness  Normal labor [O80, Z37.9]       Hospital Course  Patient is a 32 y.o. female presented with contractions at 37 weeks gestation.  She was 6-7 cm on admission.  She was admitted to labor and delivery and received an epidural.  She was noted to have severe range blood pressures upon admission and they persisted after her epidural.  Magnesium sulfate was started for seizure prophylaxis.  Lab work was normal.  She had a vaginal delivery of a liveborn female infant weighing 5 lbs. 4 oz.  Infant did require admission to the NICU for  abstinence syndrome.  Her magnesium sulfate was discontinued 12 hours after labor and she was started on oral labetalol.  Her blood pressures remained severely elevated and Procardia was also added.  Her postoperative course was otherwise uncomplicated and on postpartum day #4, she was denying any signs or symptoms of preeclampsia and her blood pressures were in the mild range.  She was instructed on warning signs and instructed to follow-up within 1 week in the office for blood pressure check.    Procedures Performed         Consults:   Consults     No orders found from 2017 to 3/10/2017.          Condition on Discharge:  Stable    Vital Signs  Temp:  [97.4 °F (36.3 °C)-98.1 °F (36.7 °C)] 98.1 °F (36.7 °C)  Heart Rate:  [61-78] 69  Resp:  [16-18] 18  BP: (148-159)/(79-98) 152/95    Physical Exam:   see progress note    Discharge Disposition  Home or Self Care    Discharge Medications   Johanna Moura   Home Medication Instructions [id number]    Printed on:17 1010   Medication Information                      HYDROcodone-acetaminophen (NORCO)  MG per tablet  Take 1 tablet by mouth Every 4 (Four) Hours As Needed for severe pain (7-10) for up to 6 days.             labetalol (NORMODYNE) 300 MG tablet  Take 1 tablet by  mouth Every 12 (Twelve) Hours.             NIFEdipine XL (ADALAT CC) 30 MG 24 hr tablet  Take 1 tablet by mouth Daily.                 Discharge Diet:   Diet Instructions     Diet:       Diet Texture / Consistency:  Regular                 Activity at Discharge:   Activity Instructions     Pelvic Rest                     Follow-up Appointments  No future appointments.  Additional Instructions for the Follow-ups that You Need to Schedule     Call MD With Problems / Concerns    As directed    Instructions: Call with heavy vaginal bleeding, fevers, uncontrolled pain, worsening headaches, vision changes       Follow-Up    As directed    Followup with Dr. Ann within 1 week for blood pressure check                 Test Results Pending at Discharge       Sydney Gutierrez MD  03/14/17  10:10 AM    Time: Discharge >30 min

## 2017-03-17 NOTE — PROGRESS NOTES
Continued Stay Note  [facility]     Patient Name: Johanna Moura  MRN: [id number]  Today's Date: 3/17/2017    Admit Date: 3/9/2017          Discharge Plan       03/17/17 1516    Case Management/Social Work Plan    Additional Comments CCP spoke with CPS Supervisor La Nena Hinkle ([phone]) who reports CPS determination that baby can be discharged to mother's care. CCP contacted baby's nurse to inform of determination, and remains available to assist should additional needs arise. Christiana Larsen LCSW              Discharge Codes     None        Expected Discharge Date and Time     Expected Discharge Date Expected Discharge Time    Mar 14, 2017             Whitney Larsen LCSW

## 2017-07-06 ENCOUNTER — DOCUMENTATION (OUTPATIENT)
Dept: SOCIAL WORK | Facility: HOSPITAL | Age: 32
End: 2017-07-06

## 2017-07-06 NOTE — PROGRESS NOTES
received incoming call from CPS  (Kristin Hughes, ph: [phone]) who reports active case has been reassigned to her and requests meconium results be faxed to her. CPS supervisor remains La Nena Hinkle ([phone]).  faxed meconium results per request (fax: [phone]). Christiana Larsen LCSW

## 2017-08-26 ENCOUNTER — HOSPITAL ENCOUNTER (EMERGENCY)
Dept: HOSPITAL 23 - SED | Age: 32
Discharge: HOME | End: 2017-08-26
Payer: COMMERCIAL

## 2017-08-26 VITALS — HEIGHT: 68 IN | WEIGHT: 120 LBS | BODY MASS INDEX: 18.19 KG/M2
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
## 2017-08-26 DIAGNOSIS — F17.200: ICD-10-CM
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
## 2017-08-26 DIAGNOSIS — S63.501A: ICD-10-CM

## 2017-08-26 DIAGNOSIS — S93.401A: Primary | ICD-10-CM
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
## 2017-08-26 DIAGNOSIS — S93.601A: ICD-10-CM

## 2021-02-28 NOTE — PROGRESS NOTES
Pt still needs Gtt1, she says she will come tomorrow. Not able to leave u/a sample.  Patient still smoking half pack to pack per day.  Patient is going to do glucola tomorrow.  Discussed positive opioid screen - patient denies any use of narcotics.  Discussed risks.  Sonogram with normal growth.  Breech noted.  Follow up in 2 weeks.  Sign tubal consents.   Personal collateral

## 2022-07-06 NOTE — PROGRESS NOTES
Cc:  Pregnancy follow up.  C/o cough and feet swelling.  Good FM.  No F/C/N/V/D.  Patient states she has been using cough syrup for cough symptoms.  Exam as listed above.  A/P:  IUP at 33 weeks with LRI/smoker.  Discussed importance of quitting smoking.  Smoking cessation issues discussed.  Triaminic for cough.  Follow up in 2 weeks.  Discussed maternal well being.  I spent 20 minutes with patient and greater than 50% of that time in face to face counseling.   06-Jul-2022 02:39

## 2024-01-31 ENCOUNTER — TELEPHONE (OUTPATIENT)
Dept: OBSTETRICS AND GYNECOLOGY | Facility: CLINIC | Age: 39
End: 2024-01-31

## 2024-01-31 NOTE — TELEPHONE ENCOUNTER
Dr. Ann,    Patient is calling to schedule NEW OB appointment. 16 weeks and has had prenatal care elsewhere in [place]. She is working on getting those records faxed to us.   Can I schedule her with you?    Please advise  Thanks Jannet

## 2024-02-27 ENCOUNTER — INITIAL PRENATAL (OUTPATIENT)
Dept: OBSTETRICS AND GYNECOLOGY | Facility: CLINIC | Age: 39
End: 2024-02-27
Payer: MEDICAID

## 2024-02-27 ENCOUNTER — TELEPHONE (OUTPATIENT)
Dept: OBSTETRICS AND GYNECOLOGY | Facility: CLINIC | Age: 39
End: 2024-02-27

## 2024-02-27 VITALS — WEIGHT: 122 LBS | BODY MASS INDEX: 18.55 KG/M2 | DIASTOLIC BLOOD PRESSURE: 65 MMHG | SYSTOLIC BLOOD PRESSURE: 103 MMHG

## 2024-02-27 DIAGNOSIS — O99.322 SUBOXONE MAINTENANCE TREATMENT COMPLICATING PREGNANCY, ANTEPARTUM, SECOND TRIMESTER: ICD-10-CM

## 2024-02-27 DIAGNOSIS — F11.20 SUBOXONE MAINTENANCE TREATMENT COMPLICATING PREGNANCY, ANTEPARTUM, SECOND TRIMESTER: ICD-10-CM

## 2024-02-27 DIAGNOSIS — O09.522 ADVANCED MATERNAL AGE IN MULTIGRAVIDA, SECOND TRIMESTER: Primary | ICD-10-CM

## 2024-02-27 DIAGNOSIS — Z3A.16 16 WEEKS GESTATION OF PREGNANCY: ICD-10-CM

## 2024-02-27 LAB
EXPIRATION DATE: NORMAL
GLUCOSE UR STRIP-MCNC: NEGATIVE MG/DL
Lab: NORMAL
PROT UR STRIP-MCNC: NEGATIVE MG/DL

## 2024-02-27 RX ORDER — BUPRENORPHINE HYDROCHLORIDE AND NALOXONE HYDROCHLORIDE DIHYDRATE 2; .5 MG/1; MG/1
TABLET SUBLINGUAL
COMMUNITY
Start: 2023-11-14

## 2024-02-27 RX ORDER — ASPIRIN 81 MG/1
81 TABLET, CHEWABLE ORAL DAILY
Qty: 30 TABLET | Refills: 7 | Status: SHIPPED | OUTPATIENT
Start: 2024-02-27

## 2024-02-27 RX ORDER — PRENATAL VIT/IRON FUM/FOLIC AC 27MG-0.8MG
1 TABLET ORAL DAILY
COMMUNITY
Start: 2023-12-18

## 2024-02-27 NOTE — TELEPHONE ENCOUNTER
Renée    Please call her and let her know I called in a baby aspirin.  This helps prevent complications in pregnancy in older patients.    Also - her next visit should be in 3 weeks with ultrasound for anatomy.  Please confirm this.    Thanks    fer

## 2024-02-27 NOTE — TELEPHONE ENCOUNTER
L/m for pt to confirm appt on 3/19/24 for u/s & ob f/u.  Also inform pt of rx sent to pharmacy.

## 2024-02-27 NOTE — PROGRESS NOTES
Cc:  Pregnancy follow up.  Patient was not on birth control when she conceived.   Cycles were regular and she was tested thru drug treatment facility.  She is currently on Suboxone 16 mg for opiate usage and is treated through [facility].    Past medical, surgical, obstetric, genetic, social and family histories reviewed by me.  Allergies and medications reviewed.  Physical exam documented in chart.  ROS reviewed and all pertinent negative.  Ultrasound with 16+ week gestation with cardiac activity.  Prenatal labs ordered  A/P:  IUP at 16 weeks with AMA, opioid abuse and maternal tobacco use.  - AMA.  Aneuploidy testing today.  Discussed risks in pregnancy including IUGR, PIH, GDM, IUFD.  Will require increased surveillance.  - Opioid use.  Discussed risks of Suboxone to .  However, it is not recommended to stop treatment as there are increased risks of relapse.  - Tobacco use.  Patient attempting to quit and is weaning.  Encouraged to continue to work to quit.  - Discussed maternal well being.

## 2024-02-28 LAB
ABO GROUP BLD: ABNORMAL
AMPHETAMINES UR QL SCN: NEGATIVE NG/ML
BARBITURATES UR QL SCN: NEGATIVE NG/ML
BASOPHILS # BLD AUTO: 0 X10E3/UL (ref 0–0.2)
BASOPHILS NFR BLD AUTO: 0 %
BENZODIAZ UR QL: NEGATIVE NG/ML
BLD GP AB SCN SERPL QL: NEGATIVE
BZE UR QL: NEGATIVE NG/ML
CANNABINOIDS UR QL SCN: NEGATIVE NG/ML
EOSINOPHIL # BLD AUTO: 0.2 X10E3/UL (ref 0–0.4)
EOSINOPHIL NFR BLD AUTO: 3 %
ERYTHROCYTE [DISTWIDTH] IN BLOOD BY AUTOMATED COUNT: 12.6 % (ref 11.7–15.4)
HBV SURFACE AG SERPL QL IA: NEGATIVE
HCT VFR BLD AUTO: 35.6 % (ref 34–46.6)
HCV IGG SERPL QL IA: NON REACTIVE
HGB BLD-MCNC: 11.2 G/DL (ref 11.1–15.9)
HIV 1+2 AB+HIV1 P24 AG SERPL QL IA: NON REACTIVE
IMM GRANULOCYTES # BLD AUTO: 0 X10E3/UL (ref 0–0.1)
IMM GRANULOCYTES NFR BLD AUTO: 0 %
LYMPHOCYTES # BLD AUTO: 1.9 X10E3/UL (ref 0.7–3.1)
LYMPHOCYTES NFR BLD AUTO: 23 %
MCH RBC QN AUTO: 25.6 PG (ref 26.6–33)
MCHC RBC AUTO-ENTMCNC: 31.5 G/DL (ref 31.5–35.7)
MCV RBC AUTO: 82 FL (ref 79–97)
METHADONE UR QL SCN: NEGATIVE NG/ML
MONOCYTES # BLD AUTO: 0.5 X10E3/UL (ref 0.1–0.9)
MONOCYTES NFR BLD AUTO: 6 %
NEUTROPHILS # BLD AUTO: 5.4 X10E3/UL (ref 1.4–7)
NEUTROPHILS NFR BLD AUTO: 68 %
OPIATES UR QL: NEGATIVE NG/ML
PCP UR QL SCN: NEGATIVE NG/ML
PLATELET # BLD AUTO: 277 X10E3/UL (ref 150–450)
PROPOXYPH UR QL SCN: NEGATIVE NG/ML
RBC # BLD AUTO: 4.37 X10E6/UL (ref 3.77–5.28)
RH BLD: POSITIVE
RPR SER QL: NON REACTIVE
RUBV IGG SERPL IA-ACNC: 6.58 INDEX
WBC # BLD AUTO: 8 X10E3/UL (ref 3.4–10.8)

## 2024-02-29 LAB
BACTERIA UR CULT: NO GROWTH
BACTERIA UR CULT: NORMAL

## 2024-03-01 ENCOUNTER — TELEPHONE (OUTPATIENT)
Dept: OBSTETRICS AND GYNECOLOGY | Facility: CLINIC | Age: 39
End: 2024-03-01
Payer: MEDICAID

## 2024-03-01 LAB
A VAGINAE DNA VAG QL NAA+PROBE: ABNORMAL SCORE
BVAB2 DNA VAG QL NAA+PROBE: ABNORMAL SCORE
C ALBICANS DNA VAG QL NAA+PROBE: NEGATIVE
C GLABRATA DNA VAG QL NAA+PROBE: NEGATIVE
C TRACH DNA VAG QL NAA+PROBE: NEGATIVE
MEGA1 DNA VAG QL NAA+PROBE: ABNORMAL SCORE
N GONORRHOEA DNA VAG QL NAA+PROBE: NEGATIVE
T VAGINALIS DNA VAG QL NAA+PROBE: NEGATIVE

## 2024-03-01 RX ORDER — METRONIDAZOLE 500 MG/1
500 TABLET ORAL 2 TIMES DAILY
Qty: 14 TABLET | Refills: 0 | Status: SHIPPED | OUTPATIENT
Start: 2024-03-01 | End: 2024-03-08

## 2024-03-01 NOTE — TELEPHONE ENCOUNTER
Sabine    Let her know that she has a bacterial infection.    I called in antibiotics that are safe in pregnancy.    Thanks    Marissa

## 2024-03-03 ENCOUNTER — TELEPHONE (OUTPATIENT)
Dept: OBSTETRICS AND GYNECOLOGY | Facility: CLINIC | Age: 39
End: 2024-03-03
Payer: MEDICAID

## 2024-03-03 LAB
CFDNA.FET/CFDNA.TOTAL SFR FETUS: NORMAL %
CITATION REF LAB TEST: NORMAL
FET 13+18+21+X+Y ANEUP PLAS.CFDNA: NEGATIVE
FET CHR 21 TS PLAS.CFDNA QL: NEGATIVE
FET SEX PLAS.CFDNA DOSAGE CFDNA: NORMAL
FET TS 13 RISK PLAS.CFDNA QL: NEGATIVE
FET TS 18 RISK WBC.DNA+CFDNA QL: NEGATIVE
GA EST FROM CONCEPTION DATE: NORMAL D
GESTATIONAL AGE > 9:: YES
LAB DIRECTOR NAME PROVIDER: NORMAL
LAB DIRECTOR NAME PROVIDER: NORMAL
LABORATORY COMMENT REPORT: NORMAL
LIMITATIONS OF THE TEST: NORMAL
NEGATIVE PREDICTIVE VALUE: NORMAL
NOTE: NORMAL
PERFORMANCE CHARACTERISTICS: NORMAL
POSITIVE PREDICTIVE VALUE: NORMAL
REF LAB TEST METHOD: NORMAL
TEST PERFORMANCE INFO SPEC: NORMAL

## 2024-03-03 NOTE — TELEPHONE ENCOUNTER
Leta    Let her know that her genetic testing for Down Syndrome was negative.    If she wants to know gender, she is having a girl.    Thanks    Marissa

## 2024-03-19 ENCOUNTER — ROUTINE PRENATAL (OUTPATIENT)
Dept: OBSTETRICS AND GYNECOLOGY | Facility: CLINIC | Age: 39
End: 2024-03-19
Payer: MEDICAID

## 2024-03-19 VITALS — SYSTOLIC BLOOD PRESSURE: 95 MMHG | DIASTOLIC BLOOD PRESSURE: 58 MMHG | BODY MASS INDEX: 19.01 KG/M2 | WEIGHT: 125 LBS

## 2024-03-19 DIAGNOSIS — O99.322 SUBOXONE MAINTENANCE TREATMENT COMPLICATING PREGNANCY, ANTEPARTUM, SECOND TRIMESTER: ICD-10-CM

## 2024-03-19 DIAGNOSIS — Z3A.19 19 WEEKS GESTATION OF PREGNANCY: ICD-10-CM

## 2024-03-19 DIAGNOSIS — F11.20 SUBOXONE MAINTENANCE TREATMENT COMPLICATING PREGNANCY, ANTEPARTUM, SECOND TRIMESTER: ICD-10-CM

## 2024-03-19 DIAGNOSIS — O09.522 ADVANCED MATERNAL AGE IN MULTIGRAVIDA, SECOND TRIMESTER: Primary | ICD-10-CM

## 2024-03-19 PROCEDURE — 99213 OFFICE O/P EST LOW 20 MIN: CPT | Performed by: OBSTETRICS & GYNECOLOGY

## 2024-03-19 NOTE — PROGRESS NOTES
Cc:  Pregnancy  Patient c/o bilateral thigh cramping. No bleeding or spotting.  Some fetal movement noted.    Vitals reviewed by me.  Gen - alert and pleasant.  Abdomen - gravid, nontender.  Ultrasound with normal anatomy.  A/P:  IUP at 19 weeks with AMA, suboxone use  - AMA.  Normal anatomy and cfDNA  - Suboxone maintenance.  Continue treatment.  - Discussed maternal well being.

## 2024-04-30 ENCOUNTER — ROUTINE PRENATAL (OUTPATIENT)
Dept: OBSTETRICS AND GYNECOLOGY | Facility: CLINIC | Age: 39
End: 2024-04-30
Payer: MEDICAID

## 2024-04-30 VITALS — DIASTOLIC BLOOD PRESSURE: 67 MMHG | SYSTOLIC BLOOD PRESSURE: 102 MMHG | BODY MASS INDEX: 19.77 KG/M2 | WEIGHT: 130 LBS

## 2024-04-30 DIAGNOSIS — Z3A.25 25 WEEKS GESTATION OF PREGNANCY: ICD-10-CM

## 2024-04-30 DIAGNOSIS — F11.20 SUBOXONE MAINTENANCE TREATMENT COMPLICATING PREGNANCY, ANTEPARTUM, SECOND TRIMESTER: ICD-10-CM

## 2024-04-30 DIAGNOSIS — O99.332 MATERNAL TOBACCO USE IN SECOND TRIMESTER: ICD-10-CM

## 2024-04-30 DIAGNOSIS — O99.322 SUBOXONE MAINTENANCE TREATMENT COMPLICATING PREGNANCY, ANTEPARTUM, SECOND TRIMESTER: ICD-10-CM

## 2024-04-30 DIAGNOSIS — O09.522 ADVANCED MATERNAL AGE IN MULTIGRAVIDA, SECOND TRIMESTER: Primary | ICD-10-CM

## 2024-04-30 DIAGNOSIS — O26.849 FUNDAL HEIGHT LOW FOR DATES, ANTEPARTUM: ICD-10-CM

## 2024-04-30 PROCEDURE — 99214 OFFICE O/P EST MOD 30 MIN: CPT | Performed by: OBSTETRICS & GYNECOLOGY

## 2024-04-30 NOTE — PROGRESS NOTES
Cc:  Pregnancy follow up.  Patient c/o mild abdominal cramping.  Has adequate fetal movement.  No bleeding or spotting.   Vitals reviewed by me.  Gen - alert and pleasant.  Abdomen - gravid, nontender  Cc u/a negative.    Patient to do GTT1 with FTA next visit.  A/P:  IUP at 25 weeks with abdominal pain in pregnancy, suboxone maintenance  - Pain.  Urine culture sent.  Likely physiologic.  - Suboxone maintenance/Fundal height low/Maternal tobacco use.  Sonogram in 3 weeks.  Patient at risk for growth abnormalities.

## 2024-05-01 LAB
BILIRUB BLD-MCNC: NEGATIVE MG/DL
EXPIRATION DATE: ABNORMAL
GLUCOSE UR STRIP-MCNC: NEGATIVE MG/DL
KETONES UR QL: NEGATIVE
LEUKOCYTE EST, POC: NEGATIVE
Lab: ABNORMAL
NITRITE UR-MCNC: NEGATIVE MG/ML
PH UR: 7 [PH] (ref 5–8)
PROT UR STRIP-MCNC: NEGATIVE MG/DL
RBC # UR STRIP: NEGATIVE /UL
SP GR UR: 1.02 (ref 1–1.03)
UROBILINOGEN UR QL: ABNORMAL

## 2024-05-03 LAB
BACTERIA UR CULT: NORMAL
BACTERIA UR CULT: NORMAL

## 2024-05-21 ENCOUNTER — ROUTINE PRENATAL (OUTPATIENT)
Dept: OBSTETRICS AND GYNECOLOGY | Facility: CLINIC | Age: 39
End: 2024-05-21
Payer: MEDICAID

## 2024-05-21 VITALS — WEIGHT: 130 LBS | DIASTOLIC BLOOD PRESSURE: 72 MMHG | SYSTOLIC BLOOD PRESSURE: 109 MMHG | BODY MASS INDEX: 19.77 KG/M2

## 2024-05-21 DIAGNOSIS — O09.523 ADVANCED MATERNAL AGE IN MULTIGRAVIDA, THIRD TRIMESTER: Primary | ICD-10-CM

## 2024-05-21 DIAGNOSIS — O36.5939 SGA (SMALL FOR GESTATIONAL AGE), FETAL, AFFECTING CARE OF MOTHER, ANTEPARTUM, THIRD TRIMESTER, OTHER FETUS: ICD-10-CM

## 2024-05-21 DIAGNOSIS — F11.20 SUBOXONE MAINTENANCE TREATMENT COMPLICATING PREGNANCY, ANTEPARTUM, THIRD TRIMESTER: ICD-10-CM

## 2024-05-21 DIAGNOSIS — O99.323 SUBOXONE MAINTENANCE TREATMENT COMPLICATING PREGNANCY, ANTEPARTUM, THIRD TRIMESTER: ICD-10-CM

## 2024-05-21 DIAGNOSIS — Z3A.28 28 WEEKS GESTATION OF PREGNANCY: ICD-10-CM

## 2024-05-21 DIAGNOSIS — O99.333 MATERNAL TOBACCO USE IN THIRD TRIMESTER: ICD-10-CM

## 2024-05-21 LAB
EXPIRATION DATE: ABNORMAL
GLUCOSE UR STRIP-MCNC: NEGATIVE MG/DL
Lab: ABNORMAL
PROT UR STRIP-MCNC: ABNORMAL MG/DL

## 2024-05-21 NOTE — PROGRESS NOTES
Cc:  Pregnancy follow up.  Patient concerned with FM -- patient has good FM and then it diminishes in the morning but increases as day goes on.  Notably, patient takes Suboxone in morning.  No bleeding or spotting.  She will come this Thursday to complete her GTT1 with FTA.  Vitals reviewed by me.  Gen - alert and pleasant.  Abdomen - gravid, nontender  Ultrasound with AC at 17th percentile with normal MARCOS.  A/P:  IUP at 28 weeks SGA, diminished FM, maternal tobacco use.  - SGA.  Repeat sonogram for growth in 3 weeks.  - Decrease FM.  Decrease occurs after taking Suboxone but improves as the duration from taking medication increases.  Reassurance given.  - Tobacco use.  Advised to quit smoking.  - Discussed nutrition and maternal well being.

## 2024-06-11 ENCOUNTER — ROUTINE PRENATAL (OUTPATIENT)
Dept: OBSTETRICS AND GYNECOLOGY | Facility: CLINIC | Age: 39
End: 2024-06-11

## 2024-06-11 VITALS — SYSTOLIC BLOOD PRESSURE: 122 MMHG | WEIGHT: 132 LBS | BODY MASS INDEX: 20.07 KG/M2 | DIASTOLIC BLOOD PRESSURE: 86 MMHG

## 2024-06-11 DIAGNOSIS — O99.323 SUBOXONE MAINTENANCE TREATMENT COMPLICATING PREGNANCY, ANTEPARTUM, THIRD TRIMESTER: ICD-10-CM

## 2024-06-11 DIAGNOSIS — O09.523 ADVANCED MATERNAL AGE IN MULTIGRAVIDA, THIRD TRIMESTER: Primary | ICD-10-CM

## 2024-06-11 DIAGNOSIS — F11.20 SUBOXONE MAINTENANCE TREATMENT COMPLICATING PREGNANCY, ANTEPARTUM, THIRD TRIMESTER: ICD-10-CM

## 2024-06-11 DIAGNOSIS — O99.333 MATERNAL TOBACCO USE IN THIRD TRIMESTER: ICD-10-CM

## 2024-06-11 DIAGNOSIS — Z3A.31 31 WEEKS GESTATION OF PREGNANCY: ICD-10-CM

## 2024-06-11 PROCEDURE — 99214 OFFICE O/P EST MOD 30 MIN: CPT | Performed by: OBSTETRICS & GYNECOLOGY

## 2024-06-11 NOTE — PROGRESS NOTES
Cc:  Pregnancy follow up.  No complaints.  Good FM.  No bleeding or spotting.  Patient declined to do glucola today.  Vitals reviewed by me.  Gen - alert and pleasant.  Abdomen - gravid, nontender.  Ultrasound with AC at 12th percentile.  Normal MARCOS.  Cephalic.  A/P:  IUP at 31 weeks with AMA, SGA, suboxone in pregnancy.  - AMA.  Discussed importance of completing glucola because of risks of GDM.  Patient to do next visit.  - SGA.  Sonogram at 35 weeks.  - Suboxone.  Weekly BPP last month of pregnancy.  - Discussed maternal well being.

## 2024-06-27 LAB — T PALLIDUM AB SER QL IF: NON REACTIVE

## 2024-07-02 ENCOUNTER — ROUTINE PRENATAL (OUTPATIENT)
Dept: OBSTETRICS AND GYNECOLOGY | Facility: CLINIC | Age: 39
End: 2024-07-02

## 2024-07-02 VITALS — SYSTOLIC BLOOD PRESSURE: 141 MMHG | BODY MASS INDEX: 20.07 KG/M2 | DIASTOLIC BLOOD PRESSURE: 88 MMHG | WEIGHT: 132 LBS

## 2024-07-02 DIAGNOSIS — F11.20 SUBOXONE MAINTENANCE TREATMENT COMPLICATING PREGNANCY, ANTEPARTUM, THIRD TRIMESTER: ICD-10-CM

## 2024-07-02 DIAGNOSIS — O36.5939 SGA (SMALL FOR GESTATIONAL AGE), FETAL, AFFECTING CARE OF MOTHER, ANTEPARTUM, THIRD TRIMESTER, OTHER FETUS: ICD-10-CM

## 2024-07-02 DIAGNOSIS — O99.333 MATERNAL TOBACCO USE IN THIRD TRIMESTER: ICD-10-CM

## 2024-07-02 DIAGNOSIS — O09.523 ADVANCED MATERNAL AGE IN MULTIGRAVIDA, THIRD TRIMESTER: Primary | ICD-10-CM

## 2024-07-02 DIAGNOSIS — O99.323 SUBOXONE MAINTENANCE TREATMENT COMPLICATING PREGNANCY, ANTEPARTUM, THIRD TRIMESTER: ICD-10-CM

## 2024-07-02 DIAGNOSIS — Z3A.34 34 WEEKS GESTATION OF PREGNANCY: ICD-10-CM

## 2024-07-02 NOTE — PROGRESS NOTES
Cc:  Pregnancy follow up.  Good FM.  No bleeding or spotting.  Patient declines GTT1, despite counseling.  Reports swelling in legs.    BP noted.  Trace protein.  Gen - alert and pleasant.  Abdomen - gravid, nontender.  A/P:  IUP at 34 weeks with AMA, SGA, suboxone, noncompliance  - AMA/SGA.  Sonogram for growth next visit.  Start weekly BPP.  - Suboxone.  Weekly BPP at 35 weeks.  - Noncompliance.  Patient refuses testing for DM.  Risk factors including AMA and racial status.  Discussed risks and patient still refuses.  - Discussed sterilization, maternal well being.

## 2024-07-09 ENCOUNTER — ROUTINE PRENATAL (OUTPATIENT)
Dept: OBSTETRICS AND GYNECOLOGY | Facility: CLINIC | Age: 39
End: 2024-07-09
Payer: MEDICAID

## 2024-07-09 VITALS — BODY MASS INDEX: 21.59 KG/M2 | WEIGHT: 142 LBS | DIASTOLIC BLOOD PRESSURE: 79 MMHG | SYSTOLIC BLOOD PRESSURE: 131 MMHG

## 2024-07-09 DIAGNOSIS — O36.5939 SGA (SMALL FOR GESTATIONAL AGE), FETAL, AFFECTING CARE OF MOTHER, ANTEPARTUM, THIRD TRIMESTER, OTHER FETUS: ICD-10-CM

## 2024-07-09 DIAGNOSIS — O99.333 MATERNAL TOBACCO USE IN THIRD TRIMESTER: ICD-10-CM

## 2024-07-09 DIAGNOSIS — F11.20 SUBOXONE MAINTENANCE TREATMENT COMPLICATING PREGNANCY, ANTEPARTUM, THIRD TRIMESTER: ICD-10-CM

## 2024-07-09 DIAGNOSIS — O09.523 ADVANCED MATERNAL AGE IN MULTIGRAVIDA, THIRD TRIMESTER: Primary | ICD-10-CM

## 2024-07-09 DIAGNOSIS — Z3A.35 35 WEEKS GESTATION OF PREGNANCY: ICD-10-CM

## 2024-07-09 DIAGNOSIS — O99.323 SUBOXONE MAINTENANCE TREATMENT COMPLICATING PREGNANCY, ANTEPARTUM, THIRD TRIMESTER: ICD-10-CM

## 2024-07-09 NOTE — PROGRESS NOTES
Cc:  Pregnancy follow up.  Patient c/o swelling in her legs.  Some pelvic pressure and cramping.  No bleeding or spotting.  Vitals reviewed by me.  Gen - alert and pleasant.  Abdomen - gravid, nontender, no guarding or rebound.  Cervix - 70/2/-2  Ultrasound with SGA and normal BPP.  GBS done.  A/P:  IUP at 35 weeks with SGA, AMA, Suboxone treatment.  - SGA/AMA.  Continue weekly  testing.  Recommend delivery at 39 weeks if undelivered and patient agreeable.  Cervix is favorable.  - Discussed maternal well being.

## 2024-07-11 LAB — GP B STREP DNA SPEC QL NAA+PROBE: NEGATIVE

## 2024-07-16 ENCOUNTER — ROUTINE PRENATAL (OUTPATIENT)
Dept: OBSTETRICS AND GYNECOLOGY | Facility: CLINIC | Age: 39
End: 2024-07-16
Payer: MEDICAID

## 2024-07-16 VITALS — DIASTOLIC BLOOD PRESSURE: 93 MMHG | SYSTOLIC BLOOD PRESSURE: 148 MMHG | WEIGHT: 141 LBS | BODY MASS INDEX: 21.44 KG/M2

## 2024-07-16 DIAGNOSIS — O09.523 ADVANCED MATERNAL AGE IN MULTIGRAVIDA, THIRD TRIMESTER: Primary | ICD-10-CM

## 2024-07-16 DIAGNOSIS — F11.20 SUBOXONE MAINTENANCE TREATMENT COMPLICATING PREGNANCY, ANTEPARTUM, THIRD TRIMESTER: ICD-10-CM

## 2024-07-16 DIAGNOSIS — O13.3 PREGNANCY-INDUCED HYPERTENSION, THIRD TRIMESTER: ICD-10-CM

## 2024-07-16 DIAGNOSIS — O99.333 MATERNAL TOBACCO USE IN THIRD TRIMESTER: ICD-10-CM

## 2024-07-16 DIAGNOSIS — Z3A.36 36 WEEKS GESTATION OF PREGNANCY: ICD-10-CM

## 2024-07-16 DIAGNOSIS — O36.5939 SGA (SMALL FOR GESTATIONAL AGE), FETAL, AFFECTING CARE OF MOTHER, ANTEPARTUM, THIRD TRIMESTER, OTHER FETUS: ICD-10-CM

## 2024-07-16 DIAGNOSIS — O99.323 SUBOXONE MAINTENANCE TREATMENT COMPLICATING PREGNANCY, ANTEPARTUM, THIRD TRIMESTER: ICD-10-CM

## 2024-07-17 ENCOUNTER — TELEPHONE (OUTPATIENT)
Dept: OBSTETRICS AND GYNECOLOGY | Facility: CLINIC | Age: 39
End: 2024-07-17
Payer: MEDICAID

## 2024-07-17 DIAGNOSIS — O99.810 PREDIABETES IN MOTHER DURING PREGNANCY: Primary | ICD-10-CM

## 2024-07-17 LAB
ALBUMIN SERPL-MCNC: 3.2 G/DL (ref 3.9–4.9)
ALP SERPL-CCNC: 229 IU/L (ref 44–121)
ALT SERPL-CCNC: 6 IU/L (ref 0–32)
AST SERPL-CCNC: 22 IU/L (ref 0–40)
BILIRUB SERPL-MCNC: 0.3 MG/DL (ref 0–1.2)
BUN SERPL-MCNC: 2 MG/DL (ref 6–20)
BUN/CREAT SERPL: 4 (ref 9–23)
CALCIUM SERPL-MCNC: 8.5 MG/DL (ref 8.7–10.2)
CHLORIDE SERPL-SCNC: 104 MMOL/L (ref 96–106)
CO2 SERPL-SCNC: 21 MMOL/L (ref 20–29)
CREAT SERPL-MCNC: 0.53 MG/DL (ref 0.57–1)
CREAT UR-MCNC: 172 MG/DL
EGFRCR SERPLBLD CKD-EPI 2021: 121 ML/MIN/1.73
ERYTHROCYTE [DISTWIDTH] IN BLOOD BY AUTOMATED COUNT: 12.1 % (ref 11.7–15.4)
GLOBULIN SER CALC-MCNC: 3 G/DL (ref 1.5–4.5)
GLUCOSE SERPL-MCNC: 64 MG/DL (ref 70–99)
HBA1C MFR BLD: 5.7 % (ref 4.8–5.6)
HCT VFR BLD AUTO: 33.5 % (ref 34–46.6)
HGB BLD-MCNC: 10.5 G/DL (ref 11.1–15.9)
MCH RBC QN AUTO: 25.5 PG (ref 26.6–33)
MCHC RBC AUTO-ENTMCNC: 31.3 G/DL (ref 31.5–35.7)
MCV RBC AUTO: 81 FL (ref 79–97)
PLATELET # BLD AUTO: 226 X10E3/UL (ref 150–450)
POTASSIUM SERPL-SCNC: 3.5 MMOL/L (ref 3.5–5.2)
PROT SERPL-MCNC: 6.2 G/DL (ref 6–8.5)
PROT UR-MCNC: 41.2 MG/DL
PROT/CREAT UR: 240 MG/G CREAT (ref 0–200)
RBC # BLD AUTO: 4.12 X10E6/UL (ref 3.77–5.28)
SODIUM SERPL-SCNC: 138 MMOL/L (ref 134–144)
WBC # BLD AUTO: 7.2 X10E3/UL (ref 3.4–10.8)

## 2024-07-17 RX ORDER — LANCETS 30 GAUGE
1 EACH MISCELLANEOUS 4 TIMES DAILY
Qty: 100 EACH | Refills: 1 | Status: SHIPPED | OUTPATIENT
Start: 2024-07-17

## 2024-07-17 NOTE — TELEPHONE ENCOUNTER
Tiesha has contacted Johanna to schedule with DANIKA Mccollum for A1c elevation/diabetes education.

## 2024-07-17 NOTE — TELEPHONE ENCOUNTER
Lb for pt to return call. Looks like she is scheduled with Cassi on Friday. If she could bring her supplies that were sent in today to her appt then we can do diabetic counseling then. GABRIELA

## 2024-07-17 NOTE — TELEPHONE ENCOUNTER
Linsey    Let her know that her labs were normal in regard to her blood pressure.  However, we tested for diabetes with a hemoglobin A1c (this is not the same as glucola, which she refused to do) and it suggests that she has diabetes/pre-diabetes, which is a reason I felt she should get tested.    Let her know that I will let my nurse practitioner know so that she can discuss with her this week.    Thanks    Marissa    Cc:  Cassi

## 2024-07-17 NOTE — TELEPHONE ENCOUNTER
Tiesha, please reach out to her for scheduling. I have sent testing supplies to her pharmacy. -Cassi

## 2024-07-18 ENCOUNTER — HOSPITAL ENCOUNTER (EMERGENCY)
Facility: HOSPITAL | Age: 39
Discharge: HOME | End: 2024-07-19
Attending: OBSTETRICS & GYNECOLOGY | Admitting: OBSTETRICS & GYNECOLOGY
Payer: MEDICAID

## 2024-07-18 LAB
DEPRECATED RDW RBC AUTO: 32.5 FL (ref 37–54)
ERYTHROCYTE [DISTWIDTH] IN BLOOD BY AUTOMATED COUNT: 11.7 % (ref 12.3–15.4)
HCT VFR BLD AUTO: 36.5 % (ref 34–46.6)
HGB BLD-MCNC: 11.5 G/DL (ref 12–15.9)
MCH RBC QN AUTO: 24.6 PG (ref 26.6–33)
MCHC RBC AUTO-ENTMCNC: 31.5 G/DL (ref 31.5–35.7)
MCV RBC AUTO: 78.2 FL (ref 79–97)
PLATELET # BLD AUTO: 260 10*3/MM3 (ref 140–450)
PMV BLD AUTO: 10 FL (ref 6–12)
RBC # BLD AUTO: 4.67 10*6/MM3 (ref 3.77–5.28)
WBC NRBC COR # BLD AUTO: 7.93 10*3/MM3 (ref 3.4–10.8)

## 2024-07-18 PROCEDURE — 59025 FETAL NON-STRESS TEST: CPT

## 2024-07-18 PROCEDURE — 85027 COMPLETE CBC AUTOMATED: CPT | Performed by: OBSTETRICS & GYNECOLOGY

## 2024-07-18 PROCEDURE — 99284 EMERGENCY DEPT VISIT MOD MDM: CPT | Performed by: OBSTETRICS & GYNECOLOGY

## 2024-07-18 PROCEDURE — 80053 COMPREHEN METABOLIC PANEL: CPT | Performed by: OBSTETRICS & GYNECOLOGY

## 2024-07-18 PROCEDURE — 86900 BLOOD TYPING SEROLOGIC ABO: CPT | Performed by: OBSTETRICS & GYNECOLOGY

## 2024-07-18 PROCEDURE — 86850 RBC ANTIBODY SCREEN: CPT | Performed by: OBSTETRICS & GYNECOLOGY

## 2024-07-18 PROCEDURE — 86901 BLOOD TYPING SEROLOGIC RH(D): CPT | Performed by: OBSTETRICS & GYNECOLOGY

## 2024-07-18 PROCEDURE — 86780 TREPONEMA PALLIDUM: CPT | Performed by: OBSTETRICS & GYNECOLOGY

## 2024-07-18 RX ORDER — ACETAMINOPHEN 500 MG
1000 TABLET ORAL ONCE
Status: COMPLETED | OUTPATIENT
Start: 2024-07-19 | End: 2024-07-19

## 2024-07-18 NOTE — TELEPHONE ENCOUNTER
Spoke with pt regarding canceled appt with Cassi tomorrow, stated she could not make appt. Rescheduled for next Wednesday.GABRIELA

## 2024-07-19 VITALS
TEMPERATURE: 98.2 F | SYSTOLIC BLOOD PRESSURE: 148 MMHG | WEIGHT: 143 LBS | BODY MASS INDEX: 21.18 KG/M2 | HEIGHT: 69 IN | DIASTOLIC BLOOD PRESSURE: 90 MMHG | HEART RATE: 66 BPM | RESPIRATION RATE: 18 BRPM

## 2024-07-19 LAB
ABO GROUP BLD: NORMAL
ALBUMIN SERPL-MCNC: 3.4 G/DL (ref 3.5–5.2)
ALBUMIN/GLOB SERPL: 0.9 G/DL
ALP SERPL-CCNC: 257 U/L (ref 39–117)
ALT SERPL W P-5'-P-CCNC: 6 U/L (ref 1–33)
ANION GAP SERPL CALCULATED.3IONS-SCNC: 11.7 MMOL/L (ref 5–15)
AST SERPL-CCNC: 21 U/L (ref 1–32)
BACTERIA UR QL AUTO: NORMAL /HPF
BILIRUB SERPL-MCNC: 0.3 MG/DL (ref 0–1.2)
BILIRUB UR QL STRIP: NEGATIVE
BLD GP AB SCN SERPL QL: NEGATIVE
BUN SERPL-MCNC: 2 MG/DL (ref 6–20)
BUN/CREAT SERPL: 3.4 (ref 7–25)
CALCIUM SPEC-SCNC: 9.1 MG/DL (ref 8.6–10.5)
CHLORIDE SERPL-SCNC: 104 MMOL/L (ref 98–107)
CLARITY UR: CLEAR
CO2 SERPL-SCNC: 21.3 MMOL/L (ref 22–29)
COLOR UR: YELLOW
CREAT SERPL-MCNC: 0.58 MG/DL (ref 0.57–1)
CREAT UR-MCNC: 111.8 MG/DL
EGFRCR SERPLBLD CKD-EPI 2021: 119 ML/MIN/1.73
GLOBULIN UR ELPH-MCNC: 3.8 GM/DL
GLUCOSE SERPL-MCNC: 70 MG/DL (ref 65–99)
GLUCOSE UR STRIP-MCNC: NEGATIVE MG/DL
HGB UR QL STRIP.AUTO: NEGATIVE
HYALINE CASTS UR QL AUTO: NORMAL /LPF
KETONES UR QL STRIP: ABNORMAL
LEUKOCYTE ESTERASE UR QL STRIP.AUTO: ABNORMAL
NITRITE UR QL STRIP: NEGATIVE
PH UR STRIP.AUTO: 6.5 [PH] (ref 5–8)
POTASSIUM SERPL-SCNC: 3 MMOL/L (ref 3.5–5.2)
PROT ?TM UR-MCNC: 22.7 MG/DL
PROT SERPL-MCNC: 7.2 G/DL (ref 6–8.5)
PROT UR QL STRIP: ABNORMAL
PROT/CREAT UR: 203 MG/G CREA (ref 0–200)
RBC # UR STRIP: NORMAL /HPF
REF LAB TEST METHOD: NORMAL
RH BLD: POSITIVE
SODIUM SERPL-SCNC: 137 MMOL/L (ref 136–145)
SP GR UR STRIP: 1.01 (ref 1–1.03)
SQUAMOUS #/AREA URNS HPF: NORMAL /HPF
T&S EXPIRATION DATE: NORMAL
TREPONEMA PALLIDUM IGG+IGM AB [PRESENCE] IN SERUM OR PLASMA BY IMMUNOASSAY: NORMAL
UROBILINOGEN UR QL STRIP: ABNORMAL
WBC # UR STRIP: NORMAL /HPF

## 2024-07-19 PROCEDURE — 81001 URINALYSIS AUTO W/SCOPE: CPT | Performed by: OBSTETRICS & GYNECOLOGY

## 2024-07-19 PROCEDURE — 84156 ASSAY OF PROTEIN URINE: CPT | Performed by: OBSTETRICS & GYNECOLOGY

## 2024-07-19 PROCEDURE — 59025 FETAL NON-STRESS TEST: CPT

## 2024-07-19 PROCEDURE — 82570 ASSAY OF URINE CREATININE: CPT | Performed by: OBSTETRICS & GYNECOLOGY

## 2024-07-19 RX ORDER — POTASSIUM CHLORIDE 750 MG/1
30 TABLET, FILM COATED, EXTENDED RELEASE ORAL ONCE
Status: COMPLETED | OUTPATIENT
Start: 2024-07-19 | End: 2024-07-19

## 2024-07-19 RX ADMIN — POTASSIUM CHLORIDE 30 MEQ: 750 TABLET, EXTENDED RELEASE ORAL at 01:17

## 2024-07-19 RX ADMIN — ACETAMINOPHEN 1000 MG: 500 TABLET ORAL at 01:06

## 2024-07-19 NOTE — NURSING NOTE
Johanna Moura, a  at 36w5d with an JODIE of 2024, by Last Menstrual Period, was seen at [facility] EM[facility] DEPARTMENT for a nonstress test.    Chief Complaint   Patient presents with    Contractions     Arrived to [facility] with complaints of contractions, or spasms in back.  Has not timed contractions.  Baby active, denies rupture of membranes, has had some vaginal spotting, light red.  Initial blood pressure was 158/103.  Denies headache, visual disturbances, or epigastric pain.       Patient Active Problem List   Diagnosis    Pregnancy    Tobacco smoking affecting pregnancy in third trimester    Pre-eclampsia, severe, delivered    Normal delivery at term       Start Time:   Stop Time: 105    Interpretation A  Nonstress Test Interpretation A: Sara Fox  Rnc, ob

## 2024-07-19 NOTE — DISCHARGE INSTRUCTIONS
Discharged to home with instructions to keep next MD appointment, and to return to labor and delivery with contractions that are 5 minutes for 1 hour and are getting stronger, vaginal bleeding (informed that there might be some vaginal spotting after tonight's cervical exam), rupture of membranes, increased pain, decreased fetal movement.  Verbalizes knowledge of fetal kick counts.

## 2024-07-19 NOTE — OBED NOTES
[facility]  Johanna Moura  : 1985  MRN: [id number]  CSN: [id number]    OB ED Provider Note    Subjective   Muscle spasms, ctx    Johanna Moura is a 39 y.o. year old  with an Estimated Date of Delivery: 24 currently at 36w4d presenting with muscle spasms in sides and back. She also reports irregular contractions. She denies leaking or bleeding.     Prenatal care has been with Dr. Ann.  It has been complicated by AMA and GHTN. She is not on any antihypertensives. She denies headaches, vision changes, RUQ. She had normal labs on . She was 3cm dilated in office.   Her pregnancy is also complicated by substance abuse, on Suboxone, tobacco use and SGA.    OB History    Para Term  AB Living   5 4 4 0 0 4   SAB IAB Ectopic Molar Multiple Live Births   0 0 0 0 0 4      # Outcome Date GA Lbr Song/2nd Weight Sex Type Anes PTL Lv   5 Current            4 Term 03/10/17 37w0d 06:20 / 00:12 2374 g (5 lb 3.7 oz) F Vag-Spont EPI N WILL      Birth Comments: scale #3      Name: ELVI MOURA      Apgar1: 9  Apgar5: 9   3 Term 10/17/08   3090 g (6 lb 13 oz) M Vag-Spont EPI N WILL   2 Term 07   2637 g (5 lb 13 oz) F Vag-Spont EPI N WILL   1 Term 03   3090 g (6 lb 13 oz) M Vag-Spont EPI N WILL     Past Medical History:   Diagnosis Date    Preeclampsia 2017    Anxiety     Depression      Past Surgical History:   Procedure Laterality Date    TEETH EXTRACTION           No current facility-administered medications for this encounter.    No Known Allergies  Social History    Tobacco Use      Smoking status: Every Day        Packs/day: 0.25        Types: Cigarettes      Smokeless tobacco: Never    Review of Systems      Objective   Wt 64.9 kg (143 lb)   LMP 2023 (Approximate)   BMI 21.74 kg/m²   General: well developed; well nourished  no acute distress   Abdomen: soft, non-tender; no masses  gravid    FHT's: 140, moderate, +accels, no decels      Cervix: was checked (by me): 3  cm / 70 % / -2 (behind fetal head)   Presentation: cephalic   Contractions: irregular   Chest: Unlabored respirations    CV:  normal rate, regular rhythm,  no murmurs, rubs, or gallops   Ext:   No C/C/E   Back: CVA tenderness is absent bilateral        Prenatal Labs  Lab Results   Component Value Date    HGB 10.5 (L) 07/16/2024    RUBELLAABIGG 6.58 02/27/2024    HEPBSAG Negative 02/27/2024    ABSCRN Negative 02/27/2024    AJW6PUT6 Non Reactive 02/27/2024    HEPCVIRUSABY Non Reactive 02/27/2024     (H) 02/08/2017    GGTFASTING 108 02/10/2017    KOD1MGCT 101 02/10/2017    CER7TEBP 95 02/10/2017    VFU3SYNI 67 02/10/2017    STREPGPB Negative 07/09/2024    URINECX Final report 05/01/2024    CHLAMNAA Negative 02/27/2024    NGONORRHON Negative 02/27/2024       Current Labs Reviewed   CBC:   Lab Results   Component Value Date    WBC 7.93 07/18/2024    HGB 11.5 (L) 07/18/2024    HCT 36.5 07/18/2024     07/18/2024     CMP:   Lab Results   Component Value Date     07/18/2024    K 3.0 (L) 07/18/2024     07/18/2024    CO2 21.3 (L) 07/18/2024    BUN 2 (L) 07/18/2024    CREATININE 0.58 07/18/2024    GLUCOSE 70 07/18/2024    ALBUMIN 3.4 (L) 07/18/2024    CALCIUM 9.1 07/18/2024    AST 21 07/18/2024    ALT 6 07/18/2024    BILITOT 0.3 07/18/2024          Assessment and Plan  IUP at 36w4d with muscle spasms and contractions, Tylenol and IV fluids given. Cervix remains 3cm   Fetal status reassuring, NST reactive  GHTN, BP's elevated to 150's/80's to 160/83 but then decreased to 130's/80's, asymptomatic. Labs normal, P/C 0.20  Substance abuse, on suboxone  Discussed w/ Dr. Cabrales, will discharge home, follow-up with Dr. Ann to schedule IOL for GHTN.       Ina Leonard MD  7/18/2024  22:25 EDT

## 2024-07-23 ENCOUNTER — ROUTINE PRENATAL (OUTPATIENT)
Dept: OBSTETRICS AND GYNECOLOGY | Facility: CLINIC | Age: 39
End: 2024-07-23
Payer: MEDICAID

## 2024-07-23 VITALS — WEIGHT: 145 LBS | DIASTOLIC BLOOD PRESSURE: 88 MMHG | SYSTOLIC BLOOD PRESSURE: 150 MMHG | BODY MASS INDEX: 21.41 KG/M2

## 2024-07-23 DIAGNOSIS — O99.810 PREDIABETES IN MOTHER DURING PREGNANCY: ICD-10-CM

## 2024-07-23 DIAGNOSIS — O99.333 MATERNAL TOBACCO USE IN THIRD TRIMESTER: ICD-10-CM

## 2024-07-23 DIAGNOSIS — O36.5939 SGA (SMALL FOR GESTATIONAL AGE), FETAL, AFFECTING CARE OF MOTHER, ANTEPARTUM, THIRD TRIMESTER, OTHER FETUS: ICD-10-CM

## 2024-07-23 DIAGNOSIS — F11.20 SUBOXONE MAINTENANCE TREATMENT COMPLICATING PREGNANCY, ANTEPARTUM, THIRD TRIMESTER: ICD-10-CM

## 2024-07-23 DIAGNOSIS — Z3A.37 37 WEEKS GESTATION OF PREGNANCY: ICD-10-CM

## 2024-07-23 DIAGNOSIS — O13.3 PREGNANCY-INDUCED HYPERTENSION, THIRD TRIMESTER: ICD-10-CM

## 2024-07-23 DIAGNOSIS — O99.323 SUBOXONE MAINTENANCE TREATMENT COMPLICATING PREGNANCY, ANTEPARTUM, THIRD TRIMESTER: ICD-10-CM

## 2024-07-23 DIAGNOSIS — O09.523 ADVANCED MATERNAL AGE IN MULTIGRAVIDA, THIRD TRIMESTER: Primary | ICD-10-CM

## 2024-07-23 PROCEDURE — 99214 OFFICE O/P EST MOD 30 MIN: CPT | Performed by: OBSTETRICS & GYNECOLOGY

## 2024-07-23 NOTE — LETTER
24    SCHEDULING FORM  C-SECTIONS/INDUCTIONS    Patient:  Johanna Moura  :  1985    Phone: [phone] (home)     OB provider:  Zaire Ann MD    Summary:  39 y.o. female     Type of Delivery:      Priority:       Desired Date:        Time:      Dating   Estimated Date of Delivery: 24    Gestational age at Desired date of Induction or CS:   weeks   days  ----------------------------------------------------------------------------  By ACOG Guidelines, women should be 39 weeks or greater before initiating an elective induction (non-medically indicated) delivery     Maternal Indications:        Fetal/Placental Conditions:      ----------------------------------------------------------------------------    For patients less than 39 weeks with indications not included in the above list, [facility] consult is required prior to scheduling.    [facility] Approved indication:       Date of consult:      I attest that the above entries are accurate to the best of my knowledge:    Zaire Ann MD  2024  14:59 EDT

## 2024-07-23 NOTE — PROGRESS NOTES
Cc:  Pregnancy follow up.  Patient denies any headaches, chest pain or vision changes.  Good FM.  No bleeding or spotting.  BP noted.    Gen - alert and pleasant.  Abdomen - gravid, nontender.  BPP 8/8 with normal MARCOS, cephalic.  A/P:  IUP at 37 weeks with gestational HTN, AMA, SGA  - Patient with risk factors for adverse outcome and is now term.  Risk factors including gestational HTN, SGA, AMA, suboxone use and tobacco use.  She also had an elevated HgbA1c and might have GDM.  Induction arranged for darlene.  Plan discussed.    I spent greater than 30 minutes arranging induction, counseling and reviewing records.

## 2024-07-24 ENCOUNTER — HOSPITAL ENCOUNTER (INPATIENT)
Dept: LABOR AND DELIVERY | Facility: HOSPITAL | Age: 39
Discharge: HOME | End: 2024-07-24
Payer: MEDICAID

## 2024-07-24 ENCOUNTER — HOSPITAL ENCOUNTER (INPATIENT)
Facility: HOSPITAL | Age: 39
LOS: 2 days | Discharge: HOME | End: 2024-07-26
Attending: OBSTETRICS & GYNECOLOGY | Admitting: OBSTETRICS & GYNECOLOGY
Payer: MEDICAID

## 2024-07-24 ENCOUNTER — ANESTHESIA (OUTPATIENT)
Dept: LABOR AND DELIVERY | Facility: HOSPITAL | Age: 39
End: 2024-07-24
Payer: MEDICAID

## 2024-07-24 ENCOUNTER — ANESTHESIA EVENT (OUTPATIENT)
Dept: LABOR AND DELIVERY | Facility: HOSPITAL | Age: 39
End: 2024-07-24
Payer: MEDICAID

## 2024-07-24 LAB
ABO GROUP BLD: NORMAL
ALBUMIN SERPL-MCNC: 3.1 G/DL (ref 3.5–5.2)
ALBUMIN/GLOB SERPL: 1 G/DL
ALP SERPL-CCNC: 255 U/L (ref 39–117)
ALT SERPL W P-5'-P-CCNC: <5 U/L (ref 1–33)
ANION GAP SERPL CALCULATED.3IONS-SCNC: 11 MMOL/L (ref 5–15)
AST SERPL-CCNC: 18 U/L (ref 1–32)
BASOPHILS # BLD AUTO: 0.02 10*3/MM3 (ref 0–0.2)
BASOPHILS NFR BLD AUTO: 0.2 % (ref 0–1.5)
BILIRUB SERPL-MCNC: <0.2 MG/DL (ref 0–1.2)
BLD GP AB SCN SERPL QL: NEGATIVE
BUN SERPL-MCNC: 2 MG/DL (ref 6–20)
BUN/CREAT SERPL: 3.5 (ref 7–25)
CALCIUM SPEC-SCNC: 8.3 MG/DL (ref 8.6–10.5)
CHLORIDE SERPL-SCNC: 107 MMOL/L (ref 98–107)
CO2 SERPL-SCNC: 20 MMOL/L (ref 22–29)
CREAT SERPL-MCNC: 0.57 MG/DL (ref 0.57–1)
CREAT UR-MCNC: 71.3 MG/DL
DEPRECATED RDW RBC AUTO: 33.9 FL (ref 37–54)
EGFRCR SERPLBLD CKD-EPI 2021: 119.5 ML/MIN/1.73
EOSINOPHIL # BLD AUTO: 0.08 10*3/MM3 (ref 0–0.4)
EOSINOPHIL NFR BLD AUTO: 0.8 % (ref 0.3–6.2)
ERYTHROCYTE [DISTWIDTH] IN BLOOD BY AUTOMATED COUNT: 11.9 % (ref 12.3–15.4)
GLOBULIN UR ELPH-MCNC: 3.1 GM/DL
GLUCOSE SERPL-MCNC: 77 MG/DL (ref 65–99)
HCT VFR BLD AUTO: 32.3 % (ref 34–46.6)
HGB BLD-MCNC: 10 G/DL (ref 12–15.9)
IMM GRANULOCYTES # BLD AUTO: 0.06 10*3/MM3 (ref 0–0.05)
IMM GRANULOCYTES NFR BLD AUTO: 0.6 % (ref 0–0.5)
LYMPHOCYTES # BLD AUTO: 2.43 10*3/MM3 (ref 0.7–3.1)
LYMPHOCYTES NFR BLD AUTO: 25.7 % (ref 19.6–45.3)
MCH RBC QN AUTO: 24.5 PG (ref 26.6–33)
MCHC RBC AUTO-ENTMCNC: 31 G/DL (ref 31.5–35.7)
MCV RBC AUTO: 79.2 FL (ref 79–97)
MONOCYTES # BLD AUTO: 0.64 10*3/MM3 (ref 0.1–0.9)
MONOCYTES NFR BLD AUTO: 6.8 % (ref 5–12)
NEUTROPHILS NFR BLD AUTO: 6.23 10*3/MM3 (ref 1.7–7)
NEUTROPHILS NFR BLD AUTO: 65.9 % (ref 42.7–76)
NRBC BLD AUTO-RTO: 0 /100 WBC (ref 0–0.2)
PLATELET # BLD AUTO: 241 10*3/MM3 (ref 140–450)
PMV BLD AUTO: 10.8 FL (ref 6–12)
POTASSIUM SERPL-SCNC: 3.4 MMOL/L (ref 3.5–5.2)
PROT ?TM UR-MCNC: 17.7 MG/DL
PROT SERPL-MCNC: 6.2 G/DL (ref 6–8.5)
PROT/CREAT UR: 248.2 MG/G CREA (ref 0–200)
RBC # BLD AUTO: 4.08 10*6/MM3 (ref 3.77–5.28)
RH BLD: POSITIVE
SODIUM SERPL-SCNC: 138 MMOL/L (ref 136–145)
T&S EXPIRATION DATE: NORMAL
TREPONEMA PALLIDUM IGG+IGM AB [PRESENCE] IN SERUM OR PLASMA BY IMMUNOASSAY: NORMAL
WBC NRBC COR # BLD AUTO: 9.46 10*3/MM3 (ref 3.4–10.8)

## 2024-07-24 PROCEDURE — 25010000002 LABETALOL 5 MG/ML SOLUTION: Performed by: OBSTETRICS & GYNECOLOGY

## 2024-07-24 PROCEDURE — 25810000003 LACTATED RINGERS PER 1000 ML: Performed by: OBSTETRICS & GYNECOLOGY

## 2024-07-24 PROCEDURE — 86780 TREPONEMA PALLIDUM: CPT | Performed by: OBSTETRICS & GYNECOLOGY

## 2024-07-24 PROCEDURE — C1755 CATHETER, INTRASPINAL: HCPCS | Performed by: STUDENT IN AN ORGANIZED HEALTH CARE EDUCATION/TRAINING PROGRAM

## 2024-07-24 PROCEDURE — 82570 ASSAY OF URINE CREATININE: CPT | Performed by: OBSTETRICS & GYNECOLOGY

## 2024-07-24 PROCEDURE — 88307 TISSUE EXAM BY PATHOLOGIST: CPT

## 2024-07-24 PROCEDURE — 86900 BLOOD TYPING SEROLOGIC ABO: CPT | Performed by: OBSTETRICS & GYNECOLOGY

## 2024-07-24 PROCEDURE — 86850 RBC ANTIBODY SCREEN: CPT | Performed by: OBSTETRICS & GYNECOLOGY

## 2024-07-24 PROCEDURE — 25010000002 ROPIVACAINE PER 1 MG: Performed by: STUDENT IN AN ORGANIZED HEALTH CARE EDUCATION/TRAINING PROGRAM

## 2024-07-24 PROCEDURE — 84156 ASSAY OF PROTEIN URINE: CPT | Performed by: OBSTETRICS & GYNECOLOGY

## 2024-07-24 PROCEDURE — 85025 COMPLETE CBC W/AUTO DIFF WBC: CPT | Performed by: OBSTETRICS & GYNECOLOGY

## 2024-07-24 PROCEDURE — 80053 COMPREHEN METABOLIC PANEL: CPT | Performed by: OBSTETRICS & GYNECOLOGY

## 2024-07-24 PROCEDURE — 10907ZC DRAINAGE OF AMNIOTIC FLUID, THERAPEUTIC FROM PRODUCTS OF CONCEPTION, VIA NATURAL OR ARTIFICIAL OPENING: ICD-10-PCS | Performed by: OBSTETRICS & GYNECOLOGY

## 2024-07-24 PROCEDURE — 86901 BLOOD TYPING SEROLOGIC RH(D): CPT | Performed by: OBSTETRICS & GYNECOLOGY

## 2024-07-24 PROCEDURE — 59410 OBSTETRICAL CARE: CPT | Performed by: OBSTETRICS & GYNECOLOGY

## 2024-07-24 RX ORDER — ACETAMINOPHEN 325 MG/1
650 TABLET ORAL EVERY 4 HOURS PRN
Status: DISCONTINUED | OUTPATIENT
Start: 2024-07-24 | End: 2024-07-24

## 2024-07-24 RX ORDER — FAMOTIDINE 10 MG/ML
20 INJECTION, SOLUTION INTRAVENOUS ONCE AS NEEDED
Status: DISCONTINUED | OUTPATIENT
Start: 2024-07-24 | End: 2024-07-24

## 2024-07-24 RX ORDER — OXYTOCIN/0.9 % SODIUM CHLORIDE 30/500 ML
999 PLASTIC BAG, INJECTION (ML) INTRAVENOUS ONCE
Status: COMPLETED | OUTPATIENT
Start: 2024-07-24 | End: 2024-07-24

## 2024-07-24 RX ORDER — BISACODYL 10 MG
10 SUPPOSITORY, RECTAL RECTAL DAILY PRN
Status: DISCONTINUED | OUTPATIENT
Start: 2024-07-25 | End: 2024-07-26 | Stop reason: HOSPADM

## 2024-07-24 RX ORDER — BUPRENORPHINE AND NALOXONE 8; 2 MG/1; MG/1
2 FILM, SOLUBLE BUCCAL; SUBLINGUAL DAILY
Status: DISCONTINUED | OUTPATIENT
Start: 2024-07-24 | End: 2024-07-26 | Stop reason: HOSPADM

## 2024-07-24 RX ORDER — IBUPROFEN 600 MG/1
600 TABLET ORAL EVERY 6 HOURS PRN
Status: DISCONTINUED | OUTPATIENT
Start: 2024-07-24 | End: 2024-07-26 | Stop reason: HOSPADM

## 2024-07-24 RX ORDER — HYDRALAZINE HYDROCHLORIDE 20 MG/ML
5-10 INJECTION INTRAMUSCULAR; INTRAVENOUS
Status: DISCONTINUED | OUTPATIENT
Start: 2024-07-24 | End: 2024-07-24

## 2024-07-24 RX ORDER — DOCUSATE SODIUM 100 MG/1
100 CAPSULE, LIQUID FILLED ORAL 2 TIMES DAILY
Status: DISCONTINUED | OUTPATIENT
Start: 2024-07-24 | End: 2024-07-26 | Stop reason: HOSPADM

## 2024-07-24 RX ORDER — ONDANSETRON 4 MG/1
4 TABLET, ORALLY DISINTEGRATING ORAL EVERY 8 HOURS PRN
Status: DISCONTINUED | OUTPATIENT
Start: 2024-07-24 | End: 2024-07-26 | Stop reason: HOSPADM

## 2024-07-24 RX ORDER — SODIUM CHLORIDE 0.9 % (FLUSH) 0.9 %
1-10 SYRINGE (ML) INJECTION AS NEEDED
Status: DISCONTINUED | OUTPATIENT
Start: 2024-07-24 | End: 2024-07-26 | Stop reason: HOSPADM

## 2024-07-24 RX ORDER — MISOPROSTOL 200 UG/1
600 TABLET ORAL ONCE
Status: DISCONTINUED | OUTPATIENT
Start: 2024-07-24 | End: 2024-07-26 | Stop reason: HOSPADM

## 2024-07-24 RX ORDER — NIFEDIPINE 10 MG/1
10-20 CAPSULE ORAL
Status: DISCONTINUED | OUTPATIENT
Start: 2024-07-24 | End: 2024-07-24

## 2024-07-24 RX ORDER — PHYTONADIONE 1 MG/.5ML
INJECTION, EMULSION INTRAMUSCULAR; INTRAVENOUS; SUBCUTANEOUS
Status: ACTIVE
Start: 2024-07-24 | End: 2024-07-25

## 2024-07-24 RX ORDER — CALCIUM CARBONATE 500 MG/1
2 TABLET, CHEWABLE ORAL 3 TIMES DAILY PRN
Status: DISCONTINUED | OUTPATIENT
Start: 2024-07-24 | End: 2024-07-26 | Stop reason: HOSPADM

## 2024-07-24 RX ORDER — ONDANSETRON 2 MG/ML
4 INJECTION INTRAMUSCULAR; INTRAVENOUS ONCE AS NEEDED
Status: DISCONTINUED | OUTPATIENT
Start: 2024-07-24 | End: 2024-07-24

## 2024-07-24 RX ORDER — CARBOPROST TROMETHAMINE 250 UG/ML
250 INJECTION, SOLUTION INTRAMUSCULAR
Status: DISCONTINUED | OUTPATIENT
Start: 2024-07-24 | End: 2024-07-24 | Stop reason: HOSPADM

## 2024-07-24 RX ORDER — TRANEXAMIC ACID 10 MG/ML
1000 INJECTION, SOLUTION INTRAVENOUS ONCE AS NEEDED
Status: DISCONTINUED | OUTPATIENT
Start: 2024-07-24 | End: 2024-07-26 | Stop reason: HOSPADM

## 2024-07-24 RX ORDER — FAMOTIDINE 20 MG/1
20 TABLET, FILM COATED ORAL 2 TIMES DAILY PRN
Status: DISCONTINUED | OUTPATIENT
Start: 2024-07-24 | End: 2024-07-24

## 2024-07-24 RX ORDER — METHYLERGONOVINE MALEATE 0.2 MG/ML
200 INJECTION INTRAVENOUS ONCE AS NEEDED
Status: DISCONTINUED | OUTPATIENT
Start: 2024-07-24 | End: 2024-07-24 | Stop reason: HOSPADM

## 2024-07-24 RX ORDER — MORPHINE SULFATE 2 MG/ML
1 INJECTION, SOLUTION INTRAMUSCULAR; INTRAVENOUS EVERY 4 HOURS PRN
Status: DISCONTINUED | OUTPATIENT
Start: 2024-07-24 | End: 2024-07-24

## 2024-07-24 RX ORDER — OXYTOCIN/0.9 % SODIUM CHLORIDE 30/500 ML
250 PLASTIC BAG, INJECTION (ML) INTRAVENOUS CONTINUOUS
Status: DISPENSED | OUTPATIENT
Start: 2024-07-24 | End: 2024-07-24

## 2024-07-24 RX ORDER — LIDOCAINE HYDROCHLORIDE 10 MG/ML
0.5 INJECTION, SOLUTION INFILTRATION; PERINEURAL ONCE AS NEEDED
Status: DISCONTINUED | OUTPATIENT
Start: 2024-07-24 | End: 2024-07-24

## 2024-07-24 RX ORDER — MAGNESIUM CARB/ALUMINUM HYDROX 105-160MG
30 TABLET,CHEWABLE ORAL ONCE AS NEEDED
Status: DISCONTINUED | OUTPATIENT
Start: 2024-07-24 | End: 2024-07-24

## 2024-07-24 RX ORDER — FAMOTIDINE 10 MG/ML
20 INJECTION, SOLUTION INTRAVENOUS 2 TIMES DAILY PRN
Status: DISCONTINUED | OUTPATIENT
Start: 2024-07-24 | End: 2024-07-24

## 2024-07-24 RX ORDER — DIPHENHYDRAMINE HYDROCHLORIDE 50 MG/ML
12.5 INJECTION INTRAMUSCULAR; INTRAVENOUS EVERY 8 HOURS PRN
Status: DISCONTINUED | OUTPATIENT
Start: 2024-07-24 | End: 2024-07-24

## 2024-07-24 RX ORDER — ERYTHROMYCIN 5 MG/G
OINTMENT OPHTHALMIC
Status: ACTIVE
Start: 2024-07-24 | End: 2024-07-25

## 2024-07-24 RX ORDER — TERBUTALINE SULFATE 1 MG/ML
0.25 INJECTION, SOLUTION SUBCUTANEOUS AS NEEDED
Status: DISCONTINUED | OUTPATIENT
Start: 2024-07-24 | End: 2024-07-24

## 2024-07-24 RX ORDER — ROPIVACAINE HYDROCHLORIDE 2 MG/ML
INJECTION, SOLUTION EPIDURAL; INFILTRATION; PERINEURAL AS NEEDED
Status: DISCONTINUED | OUTPATIENT
Start: 2024-07-24 | End: 2024-07-24 | Stop reason: SURG

## 2024-07-24 RX ORDER — ONDANSETRON 2 MG/ML
4 INJECTION INTRAMUSCULAR; INTRAVENOUS EVERY 6 HOURS PRN
Status: DISCONTINUED | OUTPATIENT
Start: 2024-07-24 | End: 2024-07-24

## 2024-07-24 RX ORDER — OXYTOCIN/0.9 % SODIUM CHLORIDE 30/500 ML
125 PLASTIC BAG, INJECTION (ML) INTRAVENOUS ONCE AS NEEDED
Status: COMPLETED | OUTPATIENT
Start: 2024-07-24 | End: 2024-07-24

## 2024-07-24 RX ORDER — ACETAMINOPHEN 325 MG/1
650 TABLET ORAL EVERY 6 HOURS PRN
Status: DISCONTINUED | OUTPATIENT
Start: 2024-07-24 | End: 2024-07-26 | Stop reason: HOSPADM

## 2024-07-24 RX ORDER — HYDROCORTISONE 25 MG/G
CREAM TOPICAL AS NEEDED
Status: DISCONTINUED | OUTPATIENT
Start: 2024-07-24 | End: 2024-07-26 | Stop reason: HOSPADM

## 2024-07-24 RX ORDER — ONDANSETRON 2 MG/ML
4 INJECTION INTRAMUSCULAR; INTRAVENOUS EVERY 6 HOURS PRN
Status: DISCONTINUED | OUTPATIENT
Start: 2024-07-24 | End: 2024-07-26 | Stop reason: HOSPADM

## 2024-07-24 RX ORDER — ONDANSETRON 4 MG/1
4 TABLET, ORALLY DISINTEGRATING ORAL EVERY 6 HOURS PRN
Status: DISCONTINUED | OUTPATIENT
Start: 2024-07-24 | End: 2024-07-24

## 2024-07-24 RX ORDER — PRENATAL VIT/IRON FUM/FOLIC AC 27MG-0.8MG
1 TABLET ORAL DAILY
Status: DISCONTINUED | OUTPATIENT
Start: 2024-07-24 | End: 2024-07-26 | Stop reason: HOSPADM

## 2024-07-24 RX ORDER — NALOXONE HCL 0.4 MG/ML
0.4 VIAL (ML) INJECTION
Status: DISCONTINUED | OUTPATIENT
Start: 2024-07-24 | End: 2024-07-24

## 2024-07-24 RX ORDER — TEMAZEPAM 7.5 MG/1
7.5 CAPSULE ORAL NIGHTLY PRN
Status: DISCONTINUED | OUTPATIENT
Start: 2024-07-24 | End: 2024-07-26 | Stop reason: HOSPADM

## 2024-07-24 RX ORDER — LABETALOL HYDROCHLORIDE 5 MG/ML
20-80 INJECTION, SOLUTION INTRAVENOUS
Status: DISCONTINUED | OUTPATIENT
Start: 2024-07-24 | End: 2024-07-24

## 2024-07-24 RX ORDER — EPHEDRINE SULFATE 50 MG/ML
5 INJECTION, SOLUTION INTRAVENOUS
Status: DISCONTINUED | OUTPATIENT
Start: 2024-07-24 | End: 2024-07-24

## 2024-07-24 RX ORDER — FENTANYL/ROPIVACAINE/NS/PF 2MCG/ML-.2
10 PLASTIC BAG, INJECTION (ML) INJECTION CONTINUOUS
Status: DISCONTINUED | OUTPATIENT
Start: 2024-07-24 | End: 2024-07-24

## 2024-07-24 RX ORDER — MISOPROSTOL 200 UG/1
800 TABLET ORAL ONCE AS NEEDED
Status: DISCONTINUED | OUTPATIENT
Start: 2024-07-24 | End: 2024-07-24 | Stop reason: HOSPADM

## 2024-07-24 RX ORDER — OXYTOCIN/0.9 % SODIUM CHLORIDE 30/500 ML
2-20 PLASTIC BAG, INJECTION (ML) INTRAVENOUS
Status: DISCONTINUED | OUTPATIENT
Start: 2024-07-24 | End: 2024-07-24

## 2024-07-24 RX ORDER — NIFEDIPINE 30 MG/1
30 TABLET, EXTENDED RELEASE ORAL
Status: DISCONTINUED | OUTPATIENT
Start: 2024-07-24 | End: 2024-07-26 | Stop reason: HOSPADM

## 2024-07-24 RX ORDER — SODIUM CHLORIDE, SODIUM LACTATE, POTASSIUM CHLORIDE, CALCIUM CHLORIDE 600; 310; 30; 20 MG/100ML; MG/100ML; MG/100ML; MG/100ML
125 INJECTION, SOLUTION INTRAVENOUS CONTINUOUS
Status: DISCONTINUED | OUTPATIENT
Start: 2024-07-24 | End: 2024-07-24

## 2024-07-24 RX ADMIN — DOCUSATE SODIUM 100 MG: 100 CAPSULE, LIQUID FILLED ORAL at 21:53

## 2024-07-24 RX ADMIN — Medication 10 ML/HR: at 09:26

## 2024-07-24 RX ADMIN — Medication 250 ML/HR: at 14:15

## 2024-07-24 RX ADMIN — BUPRENORPHINE AND NALOXONE 2 FILM: 8; 2 FILM BUCCAL; SUBLINGUAL at 09:00

## 2024-07-24 RX ADMIN — SODIUM CHLORIDE, POTASSIUM CHLORIDE, SODIUM LACTATE AND CALCIUM CHLORIDE 125 ML/HR: 600; 310; 30; 20 INJECTION, SOLUTION INTRAVENOUS at 09:40

## 2024-07-24 RX ADMIN — SODIUM CHLORIDE, POTASSIUM CHLORIDE, SODIUM LACTATE AND CALCIUM CHLORIDE 125 ML/HR: 600; 310; 30; 20 INJECTION, SOLUTION INTRAVENOUS at 05:26

## 2024-07-24 RX ADMIN — NIFEDIPINE 30 MG: 30 TABLET, FILM COATED, EXTENDED RELEASE ORAL at 17:39

## 2024-07-24 RX ADMIN — Medication 999 ML/HR: at 13:59

## 2024-07-24 RX ADMIN — IBUPROFEN 600 MG: 600 TABLET, FILM COATED ORAL at 22:11

## 2024-07-24 RX ADMIN — ROPIVACAINE HYDROCHLORIDE 6 ML: 2 INJECTION, SOLUTION EPIDURAL; INFILTRATION at 09:26

## 2024-07-24 RX ADMIN — Medication: at 21:53

## 2024-07-24 RX ADMIN — IBUPROFEN 600 MG: 600 TABLET, FILM COATED ORAL at 16:41

## 2024-07-24 RX ADMIN — Medication 2 MILLI-UNITS/MIN: at 06:24

## 2024-07-24 RX ADMIN — LABETALOL HYDROCHLORIDE 20 MG: 5 INJECTION, SOLUTION INTRAVENOUS at 06:56

## 2024-07-24 RX ADMIN — Medication 125 ML/HR: at 15:02

## 2024-07-24 RX ADMIN — ACETAMINOPHEN 325MG 650 MG: 325 TABLET ORAL at 19:49

## 2024-07-24 RX ADMIN — NIFEDIPINE 10 MG: 10 CAPSULE ORAL at 15:12

## 2024-07-24 NOTE — H&P
Patient is a 39 y.o. female  admitted at 37+ weeks for induction of labor.  Pregnancy complicated by pregnancy induced hypertension; pre-eclampsia work up negative.  Patient had elevated blood pressures for past 2 weeks.      Patient Active Problem List   Diagnosis    Pregnancy    Tobacco smoking affecting pregnancy in third trimester    Pre-eclampsia, severe, delivered    Normal delivery at term     Prenatal care is complicated by PIH (gestational hypertension,) advanced maternal age, small for gestational age infant, and suboxone treatment.    Past Medical History:   Diagnosis Date    Anxiety     Depression     Preeclampsia        Past Surgical History:   Procedure Laterality Date    TEETH EXTRACTION             No Known Allergies    Social History     Socioeconomic History    Marital status: Single   Tobacco Use    Smoking status: Every Day     Current packs/day: 0.25     Types: Cigarettes    Smokeless tobacco: Never   Vaping Use    Vaping status: Never Used   Substance and Sexual Activity    Alcohol use: No    Drug use: Yes     Comment: states last used 1 year ago (pain meds)    Sexual activity: Yes     Partners: Male       Physical Exam  Gen: Alert and pleasant.  Vitals:    24 0730   BP: 137/91   Pulse: 66   Resp: 14   Temp: 98.1 °F (36.7 °C)   SpO2: 100%     HEENT: WNL  Abdomen: Gravid.  EFW 6lbs  Cervix: 90/4/-2.  AROM with clear fluid.  IUPC placed  FHT: Category 1     Assessment/Plan: IUP at 37 weeks  - Patient had episode of severe range blood pressures and was treated with one dose of IV Labetalol with good response.  Will add Magnesium if patient has continued BP issues.  - Allow epidural.    - Continue to titrate pitocin.  - Fetal status reassuring.    Zaire Ann MD

## 2024-07-24 NOTE — ANESTHESIA POSTPROCEDURE EVALUATION
Patient: Johanna Moura    Procedure Summary       Date: 07/24/24 Room / Location:     Anesthesia Start: 0915 Anesthesia Stop: 1357    Procedure: LABOR ANALGESIA Diagnosis:     Scheduled Providers:  Provider: Quan العلي MD    Anesthesia Type: epidural ASA Status: 2            Anesthesia Type: epidural    Vitals  Vitals Value Taken Time   /99 07/24/24 1400   Temp 36.5 °C (97.7 °F) 07/24/24 1400   Pulse 77 07/24/24 1415   Resp 14 07/24/24 1400   SpO2 100 % 07/24/24 1415   Vitals shown include unfiled device data.        Post Anesthesia Care and Evaluation      Comments: Anesthesia present throughout labor and delivery

## 2024-07-24 NOTE — PLAN OF CARE
Problem: Adult Inpatient Plan of Care  Goal: Plan of Care Review  Outcome: Ongoing, Progressing  Goal: Patient-Specific Goal (Individualized)  Outcome: Ongoing, Progressing  Goal: Absence of Hospital-Acquired Illness or Injury  Outcome: Ongoing, Progressing  Intervention: Identify and Manage Fall Risk  Intervention: Prevent and Manage VTE (Venous Thromboembolism) Risk  Goal: Optimal Comfort and Wellbeing  Outcome: Ongoing, Progressing  Intervention: Monitor Pain and Promote Comfort  Intervention: Provide Person-Centered Care  Goal: Readiness for Transition of Care  Outcome: Ongoing, Progressing     Problem: Hypertensive Disorders in Pregnancy  Goal: Maternal-Fetal Stabilization  Outcome: Ongoing, Progressing     Problem: Adjustment to Role Transition (Postpartum Vaginal Delivery)  Goal: Successful Maternal Role Transition  Outcome: Ongoing, Progressing     Problem: Bleeding (Postpartum Vaginal Delivery)  Goal: Hemostasis  Outcome: Ongoing, Progressing  Intervention: Monitor and Manage Postpartum Bleeding     Problem: Infection (Postpartum Vaginal Delivery)  Goal: Absence of Infection Signs/Symptoms  Outcome: Ongoing, Progressing  Intervention: Prevent or Manage Infection     Problem: Pain (Postpartum Vaginal Delivery)  Goal: Acceptable Pain Control  Outcome: Ongoing, Progressing  Intervention: Prevent or Manage Pain     Problem: Urinary Retention (Postpartum Vaginal Delivery)  Goal: Effective Urinary Elimination  Outcome: Ongoing, Progressing   Goal Outcome Evaluation:  Plan of Care Reviewed With: patient, mother           Outcome Evaluation:  intact. Remained in L&D for blood pressure surveilance. Infant at bedside. Pt to be bottlefed. Positive TAPS. SS consulted. Pt ambulating in room and voiding as needed. To be transferred to MB if bp remains out of severe range.

## 2024-07-24 NOTE — PLAN OF CARE
Problem: Adult Inpatient Plan of Care  Goal: Plan of Care Review  7/24/2024 0641 by Cassi Vargas RN  Outcome: Ongoing, Progressing  Flowsheets (Taken 7/24/2024 0641)  Progress: improving  Plan of Care Reviewed With: patient  Outcome Evaluation: pt arrived for scheduled IOL for GHTN/AMA/SGA. pitocin was started  7/24/2024 0641 by Cassi Vargas RN  Outcome: Ongoing, Progressing  Flowsheets (Taken 7/24/2024 0641)  Progress: improving  Plan of Care Reviewed With: patient  Outcome Evaluation: pt arrived for scheduled IOL for GHTN/AMA/SGA. pitocin was started  Goal: Patient-Specific Goal (Individualized)  7/24/2024 0641 by Cassi Vargas RN  Outcome: Ongoing, Progressing  7/24/2024 0641 by Cassi Vargas RN  Outcome: Ongoing, Progressing  Goal: Absence of Hospital-Acquired Illness or Injury  7/24/2024 0641 by Cassi Vargas RN  Outcome: Ongoing, Progressing  7/24/2024 0641 by Cassi Vargas RN  Outcome: Ongoing, Progressing  Intervention: Identify and Manage Fall Risk  Recent Flowsheet Documentation  Taken 7/24/2024 0628 by Cassi Vargas RN  Safety Promotion/Fall Prevention: safety round/check completed  Intervention: Prevent and Manage VTE (Venous Thromboembolism) Risk  Recent Flowsheet Documentation  Taken 7/24/2024 0628 by Cassi Vargas RN  Activity Management: up ad balaji  Range of Motion: active ROM (range of motion) encouraged  Goal: Optimal Comfort and Wellbeing  7/24/2024 0641 by Cassi Vargas RN  Outcome: Ongoing, Progressing  7/24/2024 0641 by Cassi Vargas RN  Outcome: Ongoing, Progressing  Intervention: Provide Person-Centered Care  Recent Flowsheet Documentation  Taken 7/24/2024 0628 by Cassi Vargas RN  Trust Relationship/Rapport:   care explained   choices provided   emotional support provided   empathic listening provided   questions answered   questions encouraged   reassurance provided   thoughts/feelings acknowledged  Goal: Readiness for Transition of Care  7/24/2024 0641  by Cassi Vargas RN  Outcome: Ongoing, Progressing  7/24/2024 0641 by Cassi Vargas RN  Outcome: Ongoing, Progressing  Intervention: Mutually Develop Transition Plan  Recent Flowsheet Documentation  Taken 7/24/2024 0548 by Cassi Vargas RN  Equipment Currently Used at Home: none  Taken 7/24/2024 0537 by Cassi Vargas RN  Transportation Anticipated: family or friend will provide  Patient/Family Anticipated Services at Transition: none  Patient/Family Anticipates Transition to: home     Problem: Bleeding (Labor)  Goal: Hemostasis  Outcome: Ongoing, Progressing     Problem: Change in Fetal Wellbeing (Labor)  Goal: Stable Fetal Wellbeing  Outcome: Ongoing, Progressing     Problem: Delayed Labor Progression (Labor)  Goal: Effective Progression to Delivery  Outcome: Ongoing, Progressing     Problem: Infection (Labor)  Goal: Absence of Infection Signs and Symptoms  Outcome: Ongoing, Progressing     Problem: Labor Pain (Labor)  Goal: Acceptable Pain Control  Outcome: Ongoing, Progressing     Problem: Uterine Tachysystole (Labor)  Goal: Normal Uterine Contraction Pattern  Outcome: Ongoing, Progressing   Goal Outcome Evaluation:  Plan of Care Reviewed With: patient        Progress: improving  Outcome Evaluation: pt arrived for scheduled IOL for GHTN/AMA/SGA. pitocin was started

## 2024-07-24 NOTE — PROGRESS NOTES
PPD 0 s/p  from IOL for gestational hypertension. Pt required treatment with IV labetalol x 1 dose on admission and then did well till immediately postpartum when she required one dose of PO nifedipine.  Her blood pressures have since been mild and was started on Procardia 30XL daily.  She is asymptomatic. She was counseled on risks, possibility of magnesium, repeating labs.  She would like to hold on magnesium if her blood pressures remain mild.  We will continue to monitor on L&D until around  and then if normal to mild will plan to transfer to postpartum. If severe range arises, will start magnesium.  Will repeat CMP, CBC in AM.

## 2024-07-24 NOTE — ANESTHESIA PREPROCEDURE EVALUATION
Anesthesia Evaluation     Patient summary reviewed and Nursing notes reviewed   no history of anesthetic complications:                Airway   Mallampati: II  TM distance: >3 FB  Neck ROM: full  Dental    (+) poor dentition    Pulmonary    Cardiovascular         Neuro/Psych  GI/Hepatic/Renal/Endo      Musculoskeletal     Abdominal    Substance History       Comment: Suboxone   OB/GYN    (+) Pregnant, pregnancy induced hypertension        Other                          Anesthesia Plan    ASA 2     epidural     (37w3d)    Anesthetic plan, risks, benefits, and alternatives have been provided, discussed and informed consent has been obtained with: patient.        CODE STATUS:    Level Of Support Discussed With: Patient  Code Status (Patient has no pulse and is not breathing): CPR (Attempt to Resuscitate)  Medical Interventions (Patient has pulse or is breathing): Full Support

## 2024-07-24 NOTE — L&D DELIVERY NOTE
DELIVERY REPORT    [facility]  Patient: Johanna Moura  : 1985  Age: 39 y.o.  Unit Number: [id number]    DATE OF DELIVERY: 2024    PREOPERATIVE DIAGNOSIS:  1)  Intrauterine Pregnancy at 37+ weeks.  2)  Gestational Hypertension.  3)  Advanced Maternal Age.  4)  Suboxone in Pregnancy.  5) Maternal Tobacco Use    POSTOPERATIVE DIAGNOSIS:  Same as pre-op diagnosis    PROCEDURE PERFORMED:   Spontaneous Vaginal Delivery    SURGEON: Zaire Ann MD    ASSISTANT:   None    ANESTHESIA:   Epidural    ESTIMATED BLOOD LOSS:   150 ml    FINDINGS:     Live Viable Male Infant // Weight  6 lbs  2 oz // Apgar 1 minute 9 and Apgar 5 minutes 9             Normal placenta and cord                        No vaginal or perineal laceration    DESCRIPTION OF PROCEDURE:  Patient is a 39 year old  at 37+ weeks admitted for induction of labor for gestational hypertension.  Patient with 2 weeks of elevated blood pressures and negative pre-eclampsia work up.  Pregnancy was complicated also by advanced maternal age, maternal tobacco use, and history of Suboxone use.  Cervix was favorable and patient was started on Pitocin.  After admission, patient had one severe range blood pressure and was treated with Labetalol.  During the remainder of her labor, blood pressures remained non-severe range.  At 4 cm, patient underwent amniotomy and an intrauterine pressure catheter was placed.  Subsequently, patient received an epidural.  She progressed along a normal labor curve with reassuring fetal status.  Patient reached the second stage and delivery team was assembled.  Perineum was prepped with betadine and patient was urged to push.  With pushing during one contraction, patient delivered a live viable female infant in the occiput anterior position.  Nose and mouth were bulb suctioned upon presentation of the head.  A loose nuchal cord was reduced.  With continued pushing, the right anterior shoulder, torso and body  delivered without difficulty.  Delayed cord clamping occurred, the cord was cut and infant was presented to parents.  Cord blood was obtained and placenta was delivered spontaneously intact with 3 vessel cord noted.  No lacerations noted.  Pitocin was given for prevention of uterine atony.    COMPLICATIONS: None  SPECIMEN:  Placenta and  Cord blood  DISPOSITION:  Mother and baby remained in LDR in stable condition       Zaire Ann MD  Completed: 7/24/2024

## 2024-07-24 NOTE — ANESTHESIA PROCEDURE NOTES
Labor Epidural      Patient reassessed immediately prior to procedure    Patient location during procedure: OB  Start Time: 7/24/2024 9:15 AM  Stop Time: 7/24/2024 9:25 AM  Indication:at surgeon's request  Performed By  Anesthesiologist: Quan العلي MD  Preanesthetic Checklist  Completed: patient identified, risks and benefits discussed and timeout performed  Prep:  Pt Position:sitting  Sterile Tech:cap, gloves, mask and sterile barrier  Prep:chlorhexidine gluconate and isopropyl alcohol  Monitoring:blood pressure monitoring, continuous pulse oximetry and EKG  Epidural Block Procedure:  Approach:midline  Guidance:landmark technique and palpation technique  Location:L3-L4  Needle Type:Tuohy  Needle Gauge:17 G  Loss of Resistance Medium: saline  Loss of Resistance: 7cm  Cath Depth at skin:12 cm  Paresthesia: none  Aspiration:negative  Test Dose:negative  Number of Attempts: 1  Post Assessment:  Dressing:occlusive dressing applied and secured with tape  Pt Tolerance:patient tolerated the procedure well with no apparent complications  Complications:no

## 2024-07-25 LAB
ALBUMIN SERPL-MCNC: 2.9 G/DL (ref 3.5–5.2)
ALBUMIN/GLOB SERPL: 0.9 G/DL
ALP SERPL-CCNC: 225 U/L (ref 39–117)
ALT SERPL W P-5'-P-CCNC: 6 U/L (ref 1–33)
ANION GAP SERPL CALCULATED.3IONS-SCNC: 8.4 MMOL/L (ref 5–15)
AST SERPL-CCNC: 22 U/L (ref 1–32)
BASOPHILS # BLD AUTO: 0.04 10*3/MM3 (ref 0–0.2)
BASOPHILS NFR BLD AUTO: 0.3 % (ref 0–1.5)
BILIRUB SERPL-MCNC: 0.3 MG/DL (ref 0–1.2)
BUN SERPL-MCNC: 3 MG/DL (ref 6–20)
BUN/CREAT SERPL: 4.9 (ref 7–25)
CALCIUM SPEC-SCNC: 8.4 MG/DL (ref 8.6–10.5)
CHLORIDE SERPL-SCNC: 106 MMOL/L (ref 98–107)
CO2 SERPL-SCNC: 23.6 MMOL/L (ref 22–29)
CREAT SERPL-MCNC: 0.61 MG/DL (ref 0.57–1)
DEPRECATED RDW RBC AUTO: 33.8 FL (ref 37–54)
EGFRCR SERPLBLD CKD-EPI 2021: 117.5 ML/MIN/1.73
EOSINOPHIL # BLD AUTO: 0.06 10*3/MM3 (ref 0–0.4)
EOSINOPHIL NFR BLD AUTO: 0.4 % (ref 0.3–6.2)
ERYTHROCYTE [DISTWIDTH] IN BLOOD BY AUTOMATED COUNT: 11.9 % (ref 12.3–15.4)
GLOBULIN UR ELPH-MCNC: 3.4 GM/DL
GLUCOSE SERPL-MCNC: 77 MG/DL (ref 65–99)
HCT VFR BLD AUTO: 31.5 % (ref 34–46.6)
HGB BLD-MCNC: 9.9 G/DL (ref 12–15.9)
IMM GRANULOCYTES # BLD AUTO: 0.06 10*3/MM3 (ref 0–0.05)
IMM GRANULOCYTES NFR BLD AUTO: 0.4 % (ref 0–0.5)
LYMPHOCYTES # BLD AUTO: 2.54 10*3/MM3 (ref 0.7–3.1)
LYMPHOCYTES NFR BLD AUTO: 18.4 % (ref 19.6–45.3)
MCH RBC QN AUTO: 24.8 PG (ref 26.6–33)
MCHC RBC AUTO-ENTMCNC: 31.4 G/DL (ref 31.5–35.7)
MCV RBC AUTO: 78.8 FL (ref 79–97)
MONOCYTES # BLD AUTO: 0.83 10*3/MM3 (ref 0.1–0.9)
MONOCYTES NFR BLD AUTO: 6 % (ref 5–12)
NEUTROPHILS NFR BLD AUTO: 10.28 10*3/MM3 (ref 1.7–7)
NEUTROPHILS NFR BLD AUTO: 74.5 % (ref 42.7–76)
NRBC BLD AUTO-RTO: 0 /100 WBC (ref 0–0.2)
PLATELET # BLD AUTO: 248 10*3/MM3 (ref 140–450)
PMV BLD AUTO: 10 FL (ref 6–12)
POTASSIUM SERPL-SCNC: 3.8 MMOL/L (ref 3.5–5.2)
PROT SERPL-MCNC: 6.3 G/DL (ref 6–8.5)
RBC # BLD AUTO: 4 10*6/MM3 (ref 3.77–5.28)
SODIUM SERPL-SCNC: 138 MMOL/L (ref 136–145)
WBC NRBC COR # BLD AUTO: 13.81 10*3/MM3 (ref 3.4–10.8)

## 2024-07-25 PROCEDURE — 85025 COMPLETE CBC W/AUTO DIFF WBC: CPT | Performed by: OBSTETRICS & GYNECOLOGY

## 2024-07-25 PROCEDURE — 0503F POSTPARTUM CARE VISIT: CPT | Performed by: OBSTETRICS & GYNECOLOGY

## 2024-07-25 PROCEDURE — 80053 COMPREHEN METABOLIC PANEL: CPT | Performed by: OBSTETRICS & GYNECOLOGY

## 2024-07-25 RX ADMIN — DOCUSATE SODIUM 100 MG: 100 CAPSULE, LIQUID FILLED ORAL at 20:12

## 2024-07-25 RX ADMIN — NIFEDIPINE 30 MG: 30 TABLET, FILM COATED, EXTENDED RELEASE ORAL at 10:35

## 2024-07-25 RX ADMIN — ACETAMINOPHEN 325MG 650 MG: 325 TABLET ORAL at 02:39

## 2024-07-25 RX ADMIN — BUPRENORPHINE AND NALOXONE 2 FILM: 8; 2 FILM BUCCAL; SUBLINGUAL at 10:35

## 2024-07-25 RX ADMIN — IBUPROFEN 600 MG: 600 TABLET, FILM COATED ORAL at 20:12

## 2024-07-25 RX ADMIN — IBUPROFEN 600 MG: 600 TABLET, FILM COATED ORAL at 06:52

## 2024-07-25 RX ADMIN — DOCUSATE SODIUM 100 MG: 100 CAPSULE, LIQUID FILLED ORAL at 10:35

## 2024-07-25 RX ADMIN — Medication 1 TABLET: at 10:35

## 2024-07-25 NOTE — PROGRESS NOTES
Postpartum Progress Note      Status post Vaginal Delivery: Doing well postoperatively.     1) Postpartum care immediately following delivery :    Routine care. Anticipate discharge home in the next day.  2) Gestational hypertension:   - required treatment x 2 yesterday but last severe range was around 1500. Started on Procardia 30XL daily and BP since have been normal to mild   - PIH labs are within normal limits   3) Hypokalemia:   - improved this AM to 3.8  4) maternal anemia: 9.9 from 10.0 on admission  5) Suboxone maintenance    Rh status: RH Positive  Syphilis screen in hospital: nonreactive  Rubella: Immune  Gender: Female    Subjective     Postpartum Day 1: Vaginal delivery    The patient feels well. The patient denies emotional concerns. Pain is well controlled with current medications. The baby is well. The patient is ambulating well. The patient is tolerating a normal diet. No HA/vision changes/RUQ Pain     Objective     Vital signs in last 24 hours:  Temp:  [97.7 °F (36.5 °C)-98.5 °F (36.9 °C)] 97.8 °F (36.6 °C)  Heart Rate:  [62-92] 73  Resp:  [14-16] 16  BP: (108-167)/(66-99) 133/83      General:    alert, appears stated age, and cooperative   CV: RRR, no m/r/g   Lungs: CTAB   Abdomen:  Soft, Non-tender    Lochia:  appropriate   Uterine Fundus:   firm   Ext    Edema none   DVT Evaluation:  No evidence of DVT seen on physical exam.     Lab Results   Component Value Date    WBC 13.81 (H) 07/25/2024    HGB 9.9 (L) 07/25/2024    HCT 31.5 (L) 07/25/2024    MCV 78.8 (L) 07/25/2024     07/25/2024       Lab Results   Component Value Date    RH Positive 07/24/2024     Lab Results   Component Value Date    TREPONEMAP Non-Reactive 07/24/2024     Lab Results   Component Value Date    RUBELLAABIGG 6.58 02/27/2024           Kim Cabrales MD  7/25/2024  10:25 EDT

## 2024-07-25 NOTE — PROGRESS NOTES
"Discharge Planning Assessment  [facility]     Patient Name: Johanna Moura  MRN: [id number]  Today's Date: 7/25/2024    Admit Date: 7/24/2024    Plan: Infant may discharge to mother when medically ready. CSW will follow cord tox. TIANNA RamírezW   Discharge Needs Assessment    No documentation.                  Discharge Plan       Row Name 07/25/24 1333       Plan    Plan Infant may discharge to mother when medically ready. CSW will follow cord tox. TIANNA RamírezW    Plan Comments Mother: Johanna Moura, MRN [id number]; Infant: Joseirmoses \"Mike\" Zeny, MRN [id number]. CSW was consulted for \"mom hx of opiates- on suboxone\". Of note, mother's UDS was negative prenatally on 2/27 and not collected on admission; infant's UDS was missed, and cord toxicology has been sent. CSW met with mother and maternal grandmother of infant at bedside; mother gave consent for CSW to continue the assessment with MGM present. Mother verified her address, phone number, and insurance. A [employer] rep met with mother to add infant to Medicaid. Mother reports having a car seat, crib/bassinet, clothes, and diapers for infant. This is mother's fifth child. Her other children are ages 21, 16, 15, and 7; maternal grandfather of infant is caring for mother's minor aged children during her hospital stay. Mother shared she lives with maternal grandparents of infant with her 16, 15, and 7 year old. Mother's support system includes maternal grandparents of infant. Father of infant is not involved. Infant will follow up with Dr. Anderson, mother is comfortable scheduling appointments for infant, and has reliable transportation. Mother is current with WIC, and has already called to add infant to her plan. Mother denied feeling unsafe, threatened, or isolated from others at this time. Mother shared she has been clean for 1 year and goes to [facility] for Suboxone maintenance. CSW praised mother for her sobriety. CSW spent time building rapport with mother " and offered validation, support, and encouragement to her throughout the assessment. CSW provided a packet of resources to mother including: WIC, HANDS, transportation, infant supplies, counseling, online support groups, postpartum mood and anxiety resources, and general community resources. Mother was polite and cooperative throughout the assessment, and denied having unmet needs at this time. CSW will follow infant's cord toxicology, and complete mandated reporting to CPS if warranted. Yazmin VALENZUELA, CSW                  Continued Care and Services - Admitted Since 7/24/2024    No active coordination exists for this encounter.       Expected Discharge Date and Time       Expected Discharge Date Expected Discharge Time    Jul 25, 2024            Demographic Summary       Row Name 07/25/24 1332       General Information    Admission Type inpatient    Arrived From home    Referral Source nursing    Reason for Consult psychosocial concerns;community resources    General Information Comments hx of GILBERTO-currently on Suboxone, resources/support                   Functional Status       Row Name 07/25/24 1332       Mental Status    General Appearance WDL WDL                   Psychosocial       Row Name 07/25/24 1332       Behavior WDL    Behavior WDL WDL       Emotion Mood WDL    Emotion/Mood/Affect WDL WDL       Speech WDL    Speech WDL WDL       Perceptual State WDL    Perceptual State WDL WDL       Thought Process WDL    Thought Process WDL WDL       Intellectual Performance WDL    Intellectual Performance WDL WDL       Coping/Stress    Major Change/Loss/Stressor birth    Patient Personal Strengths able to adapt;future/goal oriented;positive attitude;strong support system    Sources of Support adult child(ludmila);dependent child(ludmila);parent(s)                   Abuse/Neglect    No documentation.                  Legal    No documentation.                  Substance Abuse       Row Name 07/25/24 1333       Substance Use     Substance Use Comment mom has been clean for 1 year, goes to [facility] for Suboxone maintenance                   Patient Forms    No documentation.                     JAVAD Greenfield

## 2024-07-25 NOTE — LACTATION NOTE
This note was copied from a baby's chart.  Pt reports she is formula feeding baby. Discussed ways to suppress milk production. Pt denies questions    Lactation Consult Note    Evaluation Completed: 2024 07:45 EDT  Patient Name: Isa Moura  :  2024  MRN:  [id number]     REFERRAL  INFORMATION:                                         DELIVERY HISTORY:  This patient has no babies on file.  This patient has no babies on file.  Skin to skin initiation date/time: 2024 2:11 PM  Skin to skin end date/time:      This patient has no babies on file.    MATERNAL ASSESSMENT:                               INFANT ASSESSMENT:  This patient has no babies on file.  This patient has no babies on file.  This patient has no babies on file.  This patient has no babies on file.  This patient has no babies on file.  This patient has no babies on file.  This patient has no babies on file.  This patient has no babies on file.  This patient has no babies on file.  This patient has no babies on file.  This patient has no babies on file.  This patient has no babies on file.  This patient has no babies on file.  This patient has no babies on file.  This patient has no babies on file.  This patient has no babies on file.  This patient has no babies on file.  This patient has no babies on file.  This patient has no babies on file.  This patient has no babies on file.      This patient has no babies on file.  This patient has no babies on file.  This patient has no babies on file.  This patient has no babies on file.  This patient has no babies on file.  This patient has no babies on file.    This patient has no babies on file.  This patient has no babies on file.  This patient has no babies on file.        MATERNAL INFANT FEEDING:                                                                       EQUIPMENT TYPE:                                 BREAST PUMPING:          LACTATION REFERRALS:

## 2024-07-26 VITALS
SYSTOLIC BLOOD PRESSURE: 144 MMHG | HEIGHT: 69 IN | HEART RATE: 71 BPM | TEMPERATURE: 98.1 F | BODY MASS INDEX: 21.8 KG/M2 | DIASTOLIC BLOOD PRESSURE: 97 MMHG | RESPIRATION RATE: 16 BRPM | OXYGEN SATURATION: 99 % | WEIGHT: 147.2 LBS

## 2024-07-26 PROBLEM — Z87.898 HISTORY OF SUBSTANCE USE: Status: ACTIVE | Noted: 2024-07-26

## 2024-07-26 PROCEDURE — 0503F POSTPARTUM CARE VISIT: CPT

## 2024-07-26 RX ORDER — PSEUDOEPHEDRINE HCL 30 MG
100 TABLET ORAL 2 TIMES DAILY
Qty: 60 CAPSULE | Refills: 2 | Status: SHIPPED | OUTPATIENT
Start: 2024-07-26

## 2024-07-26 RX ORDER — NIFEDIPINE 30 MG
30 TABLET, EXTENDED RELEASE ORAL
Qty: 30 TABLET | Refills: 2 | Status: SHIPPED | OUTPATIENT
Start: 2024-07-27

## 2024-07-26 RX ORDER — IBUPROFEN 600 MG/1
600 TABLET ORAL EVERY 6 HOURS PRN
Qty: 60 TABLET | Refills: 0 | Status: SHIPPED | OUTPATIENT
Start: 2024-07-26

## 2024-07-26 RX ADMIN — IBUPROFEN 600 MG: 600 TABLET, FILM COATED ORAL at 09:15

## 2024-07-26 RX ADMIN — NIFEDIPINE 30 MG: 30 TABLET, FILM COATED, EXTENDED RELEASE ORAL at 09:15

## 2024-07-26 RX ADMIN — DOCUSATE SODIUM 100 MG: 100 CAPSULE, LIQUID FILLED ORAL at 09:15

## 2024-07-26 RX ADMIN — BUPRENORPHINE AND NALOXONE 2 FILM: 8; 2 FILM BUCCAL; SUBLINGUAL at 09:15

## 2024-07-26 RX ADMIN — ACETAMINOPHEN 325MG 650 MG: 325 TABLET ORAL at 04:15

## 2024-07-26 RX ADMIN — Medication 1 TABLET: at 09:15

## 2024-07-26 NOTE — DISCHARGE SUMMARY
VAGINAL DELIVERY DISCHARGE SUMMARY      PATIENT: Johanna Moura        MR#:[id number]  LOCATION: [facility]  ADMISSION  DIAGNOSIS:   Pregnancy     (spontaneous vaginal delivery)    History of substance use    Gestational hypertension without significant proteinuria, postpartum     DISCHARGE DIAGNOSIS: No diagnosis found.  SERVICE: Obstetrics    DATE OF ADMISSION: 2024  DATE OF DISCHARGE: 24       Pregnancy     (spontaneous vaginal delivery)    History of substance use    Gestational hypertension without significant proteinuria, postpartum        PROCEDURES:  Vaginal, Spontaneous     2024    1:57 PM      RH STATUS: O positive  SYPHILIS SCREEN DELIVERY ADMIT: treponemal antibody non-reactive upon admission  RUBELLA: immune  VARICELLA: unknown immunity  INFANT GENDER: female, currently in the NICU    HOSPITAL COURSE: Johanna underwent vaginal delivery of a female infant and remained in the hospital for 2 days. During that time, Johanna remained afebrile and hemodynamically stable. On the day of discharge, Johanna was eating, ambulating and voiding without difficulty.      LABS:   Lab Results   Component Value Date    WBC 13.81 (H) 2024    HGB 9.9 (L) 2024    HCT 31.5 (L) 2024    MCV 78.8 (L) 2024     2024    CREATININE 0.61 2024    AST 22 2024    ALT 6 2024     Results from last 7 days   Lab Units 24  0546   ABO TYPING  O   RH TYPING  Positive   ANTIBODY SCREEN  Negative       DISCHARGE MEDICATIONS     Discharge Medications        New Medications        Instructions Start Date   docusate sodium 100 MG capsule   100 mg, Oral, 2 Times Daily      ibuprofen 600 MG tablet  Commonly known as: ADVIL,MOTRIN   600 mg, Oral, Every 6 Hours PRN      NIFEdipine CC 30 MG 24 hr tablet  Commonly known as: ADALAT CC   30 mg, Oral, Every 24 Hours Scheduled   Start Date: 2024            Continue These Medications        Instructions Start Date    aspirin 81 MG chewable tablet   81 mg, Oral, Daily      buprenorphine-naloxone 2-0.5 MG per SL tablet  Commonly known as: SUBOXONE   [[id number]]: BUPRENORPHINE-NALOXONE, 2 MG-0.5 MG X 3TABLET(S) , SUBLINGUAL, TABLET, ONCE A DAY (AM), UNTIL FURTHER NOTICE -- NOTE: 6MH      glucose blood test strip   Take blood sugar fasting (AM) and 2 hours after start of breakfast, lunch and dinner      Lancets misc   1 each, Does not apply, 4 Times Daily      prenatal vitamin 27-0.8 27-0.8 MG tablet tablet   1 tablet, Oral, Daily               DISCHARGE DISPOSITION: Home    DISCHARGE CONDITION: Stable    DISCHARGE DIET: Regular    ACTIVITY AT DISCHARGE: Pelvic rest    INFANT FEEDING PLANS: Bottle    EDUCATION: Warning signs and symptoms given, no tub baths, nothing in the vagina for 6 weeks.     FOLLOW-UP APPOINTMENTS: Follow up with [facility] in 1 week for blood pressure check  in 4 to 6 weeks for routine postpartum visit.     Qiana Ambrosio CNM  07/26/24  12:49 EDT

## 2024-07-26 NOTE — PLAN OF CARE
Goal Outcome Evaluation:      Patient education complete. Patient ready for discharge. Plans to remain a boarder as  is on a 5 day hold to monitor for signs of withdraw.

## 2024-07-26 NOTE — PROGRESS NOTES
VAGINAL DELIVERY POSTPARTUM DAY 2    2024  PATIENT: Johanna Moura        MR#:[id number]  LOCATION: [facility]  DATE OF ADMISSION: 2024  ADMISSION  DIAGNOSIS:   Pregnancy     (spontaneous vaginal delivery)    History of substance use    Gestational hypertension without significant proteinuria, postpartum     CURRENT DIAGNOSIS: No notes have been filed under this hospital service.  Service: Hospitalist    SERVICE: Obstetrics      Pregnancy     (spontaneous vaginal delivery)    History of substance use    Gestational hypertension without significant proteinuria, postpartum        PROCEDURES:  Vaginal, Spontaneous     2024    1:57 PM      RH STATUS: O positive  SYPHILIS SCREEN DELIVERY ADMIT: treponemal antibody non-reactive upon admission  RUBELLA: immune  VARICELLA: unknown immunity  INFANT GENDER: female, currently in the NICU    SUBJECTIVE   Johanna feels well.  Patient describes her lochia as less than menses.  Pain is well controlled.    OBJECTIVE   Temp: Temp:  [97.8 °F (36.6 °C)-98.2 °F (36.8 °C)] 98.1 °F (36.7 °C) Temp src: Oral   BP: BP: (127-144)/(86-97) 144/97        Pulse: Heart Rate:  [] 71  RR: Resp:  [16-18] 16    General:  Awake, alert, no acute distress   Cardiac: Regular rate and rhythm    Respiratory: Lungs clear bilaterally, normal respiratory effort    Abdomen: Soft, non-distended, fundus firm, below umbilicus, appropriately tender   Pelvis: deferred   Extremities: Calves NT bilaterally, DTR 2+, no clonus noted, trace edema     Lab Results   Component Value Date    WBC 13.81 (H) 2024    HGB 9.9 (L) 2024    HCT 31.5 (L) 2024     2024    AST 22 2024    ALT 6 2024    CREATININE 0.61 2024       ASSESSMENT: Postpartum day 2 after vaginal delivery. Hgb: 9.9.    PLAN: Doing well. Discharge to home. Discharge instructions given, precautions reviewed. Follow up with [facility] in 1 week for blood pressure check  in 4 to 6 weeks  for routine postpartum visit. Prescription for ibuprofen 600mg PO every 6 hours PRN for pain, docusate 100mg PO BID, and ferrous sulfate 325mg daily. Advised no tampons, menstrual cups, intercourse, or tub baths for 6 weeks.     Qiana Ambrosio CNM  12:49 EDT  July 26, 2024

## 2024-08-01 NOTE — PROGRESS NOTES
"Continued Stay Note  [facility]     Patient Name: Johanna Moura  MRN: [id number]  Today's Date: 8/1/2024    Admit Date: 7/24/2024    Plan: Infant may discharge to mother when medically ready. CSW will follow cord tox. HARRISON Ramírez   Discharge Plan       Row Name 08/01/24 1125       Plan    Plan Comments Mother: Johanna Moura, MRN [id number]; Infant: JohannasGirl \"Mike\" Zeny, MRN [id number]. CSW reviewed cord toxicology for infant, and it was positive for Norbuprenorphine (Suboxone); this is lab confirmed. Of note, mother is prescribed Suboxone from [facility]. CPS reporting is not required at this time. HARRISON Pond.                   Discharge Codes    No documentation.                 Expected Discharge Date and Time       Expected Discharge Date Expected Discharge Time    Jul 26, 2024               JAVAD Morales    "

## 2024-08-07 ENCOUNTER — MATERNAL SCREENING (OUTPATIENT)
Dept: CALL CENTER | Facility: HOSPITAL | Age: 39
End: 2024-08-07
Payer: MEDICAID

## 2024-08-07 NOTE — OUTREACH NOTE
Maternal Screening Survey      Flowsheet Row Responses   Facility patient discharged from? [place]   Attempt successful? No   Unsuccessful attempts Attempt 1              Eileen Vaughn Registered Nurse

## 2024-08-07 NOTE — OUTREACH NOTE
Maternal Screening Survey      Flowsheet Row Responses   Facility patient discharged from? [place]   Attempt successful? No   Unsuccessful attempts Attempt 2              Eileen GARRISON - Registered Nurse

## 2024-08-08 ENCOUNTER — MATERNAL SCREENING (OUTPATIENT)
Dept: CALL CENTER | Facility: HOSPITAL | Age: 39
End: 2024-08-08
Payer: MEDICAID

## 2024-08-08 NOTE — OUTREACH NOTE
Maternal Screening Survey      Flowsheet Row Responses   Facility patient discharged from? [facility]   Attempt successful? No   Unsuccessful attempts Attempt 3   Revoke Decline to participate              Eileen GARRISON - Registered Nurse